# Patient Record
Sex: FEMALE | Race: WHITE | HISPANIC OR LATINO
[De-identification: names, ages, dates, MRNs, and addresses within clinical notes are randomized per-mention and may not be internally consistent; named-entity substitution may affect disease eponyms.]

---

## 2018-04-28 ENCOUNTER — CHARTING TRANS (OUTPATIENT)
Dept: OTHER | Age: 23
End: 2018-04-28

## 2018-04-28 ASSESSMENT — PAIN SCALES - GENERAL: PAINLEVEL_OUTOF10: 6

## 2018-05-01 ENCOUNTER — CHARTING TRANS (OUTPATIENT)
Dept: OTHER | Age: 23
End: 2018-05-01

## 2019-03-06 VITALS
SYSTOLIC BLOOD PRESSURE: 134 MMHG | DIASTOLIC BLOOD PRESSURE: 73 MMHG | OXYGEN SATURATION: 99 % | TEMPERATURE: 98 F | RESPIRATION RATE: 18 BRPM | HEART RATE: 100 BPM

## 2019-06-20 ENCOUNTER — HOSPITAL ENCOUNTER (OUTPATIENT)
Age: 24
Discharge: HOME | End: 2019-06-20
Attending: FAMILY MEDICINE
Payer: COMMERCIAL

## 2019-06-20 ENCOUNTER — APPOINTMENT (OUTPATIENT)
Dept: GENERAL RADIOLOGY | Age: 24
End: 2019-06-20
Attending: FAMILY MEDICINE
Payer: COMMERCIAL

## 2019-06-20 VITALS
HEART RATE: 102 BPM | OXYGEN SATURATION: 97 % | WEIGHT: 195 LBS | SYSTOLIC BLOOD PRESSURE: 134 MMHG | DIASTOLIC BLOOD PRESSURE: 90 MMHG | BODY MASS INDEX: 34.55 KG/M2 | RESPIRATION RATE: 20 BRPM | HEIGHT: 63 IN | TEMPERATURE: 97 F

## 2019-06-20 DIAGNOSIS — S93.402A MODERATE LEFT ANKLE SPRAIN, INITIAL ENCOUNTER: Primary | ICD-10-CM

## 2019-06-20 PROCEDURE — 73610 X-RAY EXAM OF ANKLE: CPT | Performed by: FAMILY MEDICINE

## 2019-06-20 PROCEDURE — 99203 OFFICE O/P NEW LOW 30 MIN: CPT

## 2019-06-20 NOTE — ED INITIAL ASSESSMENT (HPI)
Patient states she rolled her left ankle going down stairs approximately 2 weeks ago. C/O left lateral ankle pain and swelling.

## 2019-06-21 NOTE — ED PROVIDER NOTES
Patient Seen in: [id number] [facility]    History   Patient presents with: Ankle Injury    Stated Complaint: L. Ankle Injury (x2 weeks)    HPI    22-year-old female presents to the immediate care today with chief complaints of injury to her lef tenderness  EXTREMITIES:   Left  ankle exam:   - defect/deformity : no   - swelling/effusion: yes - lateral malleolus   - tenderness over medial malleolus: no   - tenderness over lateral malleolus: yes   - tenderness over ATFL: yes   - tenderness over CFL:

## 2019-07-09 ENCOUNTER — OFFICE VISIT (OUTPATIENT)
Dept: PODIATRY | Age: 24
End: 2019-07-09

## 2019-07-09 DIAGNOSIS — S93.622A SPRAIN OF TARSOMETATARSAL JOINT OF LEFT FOOT, INITIAL ENCOUNTER: Primary | ICD-10-CM

## 2019-07-09 DIAGNOSIS — M20.12 VALGUS DEFORMITY OF BOTH GREAT TOES: ICD-10-CM

## 2019-07-09 DIAGNOSIS — M20.11 VALGUS DEFORMITY OF BOTH GREAT TOES: ICD-10-CM

## 2019-07-09 PROCEDURE — 99203 OFFICE O/P NEW LOW 30 MIN: CPT | Performed by: PODIATRIST

## 2019-07-11 ENCOUNTER — APPOINTMENT (OUTPATIENT)
Dept: OTOLARYNGOLOGY | Age: 24
End: 2019-07-11

## 2019-07-12 ENCOUNTER — OFFICE VISIT (OUTPATIENT)
Dept: OTOLARYNGOLOGY | Age: 24
End: 2019-07-12

## 2019-07-12 VITALS — BODY MASS INDEX: 31.89 KG/M2 | HEIGHT: 63 IN | WEIGHT: 180 LBS

## 2019-07-12 DIAGNOSIS — J03.90 TONSILLITIS: ICD-10-CM

## 2019-07-12 DIAGNOSIS — J04.0 LARYNGITIS: Primary | ICD-10-CM

## 2019-07-12 PROCEDURE — 31575 DIAGNOSTIC LARYNGOSCOPY: CPT | Performed by: OTOLARYNGOLOGY

## 2019-07-12 PROCEDURE — 99203 OFFICE O/P NEW LOW 30 MIN: CPT | Performed by: OTOLARYNGOLOGY

## 2019-07-12 RX ORDER — METHYLPREDNISOLONE 4 MG/1
4 TABLET ORAL SEE ADMIN INSTRUCTIONS
Qty: 21 TABLET | Refills: 0 | Status: SHIPPED | OUTPATIENT
Start: 2019-07-12

## 2019-07-12 RX ORDER — AMOXICILLIN AND CLAVULANATE POTASSIUM 875; 125 MG/1; MG/1
1 TABLET, FILM COATED ORAL 2 TIMES DAILY
Qty: 20 TABLET | Refills: 0 | Status: SHIPPED | OUTPATIENT
Start: 2019-07-12 | End: 2019-07-22

## 2019-10-24 ENCOUNTER — OFFICE VISIT (OUTPATIENT)
Dept: OTOLARYNGOLOGY | Age: 24
End: 2019-10-24

## 2019-10-24 VITALS — BODY MASS INDEX: 36.68 KG/M2 | WEIGHT: 207 LBS | HEIGHT: 63 IN

## 2019-10-24 DIAGNOSIS — K21.9 LARYNGOPHARYNGEAL REFLUX (LPR): Primary | ICD-10-CM

## 2019-10-24 DIAGNOSIS — R49.0 HOARSENESS: ICD-10-CM

## 2019-10-24 PROCEDURE — 99213 OFFICE O/P EST LOW 20 MIN: CPT | Performed by: OTOLARYNGOLOGY

## 2019-10-24 PROCEDURE — 31575 DIAGNOSTIC LARYNGOSCOPY: CPT | Performed by: OTOLARYNGOLOGY

## 2019-10-24 RX ORDER — OMEPRAZOLE 40 MG/1
40 CAPSULE, DELAYED RELEASE ORAL DAILY
Qty: 30 CAPSULE | Refills: 3 | Status: SHIPPED | OUTPATIENT
Start: 2019-10-24 | End: 2019-11-23

## 2019-10-30 ENCOUNTER — APPOINTMENT (OUTPATIENT)
Dept: OTOLARYNGOLOGY | Age: 24
End: 2019-10-30

## 2019-11-12 ENCOUNTER — APPOINTMENT (OUTPATIENT)
Dept: OBGYN | Age: 24
End: 2019-11-12

## 2020-01-30 ENCOUNTER — APPOINTMENT (OUTPATIENT)
Dept: OBGYN | Age: 25
End: 2020-01-30

## 2020-02-11 ENCOUNTER — APPOINTMENT (OUTPATIENT)
Dept: ALLERGY | Age: 25
End: 2020-02-11

## 2020-05-09 ENCOUNTER — TELEPHONE (OUTPATIENT)
Dept: SCHEDULING | Age: 25
End: 2020-05-09

## 2022-07-08 ENCOUNTER — HOSPITAL ENCOUNTER (EMERGENCY)
Age: 27
Discharge: HOME | End: 2022-07-08
Attending: EMERGENCY MEDICINE
Payer: COMMERCIAL

## 2022-07-08 VITALS
DIASTOLIC BLOOD PRESSURE: 91 MMHG | RESPIRATION RATE: 16 BRPM | OXYGEN SATURATION: 100 % | WEIGHT: 207 LBS | TEMPERATURE: 98 F | HEIGHT: 62 IN | HEART RATE: 88 BPM | BODY MASS INDEX: 38.09 KG/M2 | SYSTOLIC BLOOD PRESSURE: 120 MMHG

## 2022-07-08 DIAGNOSIS — R30.0 DYSURIA: Primary | ICD-10-CM

## 2022-07-08 LAB
B-HCG UR QL: NEGATIVE
BILIRUB UR QL STRIP.AUTO: NEGATIVE
CLARITY UR REFRACT.AUTO: CLEAR
COLOR UR AUTO: YELLOW
GLUCOSE UR STRIP.AUTO-MCNC: NEGATIVE MG/DL
KETONES UR STRIP.AUTO-MCNC: NEGATIVE MG/DL
NITRITE UR QL STRIP.AUTO: NEGATIVE
PH UR STRIP.AUTO: 6.5 [PH] (ref 5–8)
PROT UR STRIP.AUTO-MCNC: NEGATIVE MG/DL
RBC UR QL AUTO: NEGATIVE
SP GR UR STRIP.AUTO: 1.02 (ref 1–1.03)
UROBILINOGEN UR STRIP.AUTO-MCNC: 0.2 MG/DL

## 2022-07-08 PROCEDURE — 87086 URINE CULTURE/COLONY COUNT: CPT | Performed by: EMERGENCY MEDICINE

## 2022-07-08 PROCEDURE — 81025 URINE PREGNANCY TEST: CPT

## 2022-07-08 PROCEDURE — 87491 CHLMYD TRACH DNA AMP PROBE: CPT | Performed by: EMERGENCY MEDICINE

## 2022-07-08 PROCEDURE — 87591 N.GONORRHOEAE DNA AMP PROB: CPT | Performed by: EMERGENCY MEDICINE

## 2022-07-08 PROCEDURE — 81001 URINALYSIS AUTO W/SCOPE: CPT | Performed by: EMERGENCY MEDICINE

## 2022-07-08 PROCEDURE — 99283 EMERGENCY DEPT VISIT LOW MDM: CPT | Performed by: EMERGENCY MEDICINE

## 2022-07-08 RX ORDER — SULFAMETHOXAZOLE AND TRIMETHOPRIM 800; 160 MG/1; MG/1
1 TABLET ORAL 2 TIMES DAILY
Qty: 14 TABLET | Refills: 0 | Status: SHIPPED | OUTPATIENT
Start: 2022-07-08 | End: 2022-07-15

## 2022-07-08 RX ORDER — SULFAMETHOXAZOLE AND TRIMETHOPRIM 800; 160 MG/1; MG/1
1 TABLET ORAL ONCE
Status: COMPLETED | OUTPATIENT
Start: 2022-07-08 | End: 2022-07-08

## 2022-07-09 NOTE — ED INITIAL ASSESSMENT (HPI)
For the last 5 days painful urination. Today diarrhea and fever. Patient also mentioned miscarriage at the end of may.

## 2022-07-11 LAB
C TRACH DNA SPEC QL NAA+PROBE: NEGATIVE
N GONORRHOEA DNA SPEC QL NAA+PROBE: NEGATIVE

## 2023-03-06 ENCOUNTER — HOSPITAL ENCOUNTER (EMERGENCY)
Age: 28
Discharge: HOME | End: 2023-03-07
Attending: EMERGENCY MEDICINE
Payer: COMMERCIAL

## 2023-03-06 ENCOUNTER — APPOINTMENT (OUTPATIENT)
Dept: CT IMAGING | Age: 28
End: 2023-03-06
Attending: NURSE PRACTITIONER
Payer: COMMERCIAL

## 2023-03-06 DIAGNOSIS — K52.9 ENTEROCOLITIS: Primary | ICD-10-CM

## 2023-03-06 LAB
ALBUMIN SERPL-MCNC: 4 G/DL (ref 3.4–5)
ALBUMIN/GLOB SERPL: 0.9 {RATIO} (ref 1–2)
ALP LIVER SERPL-CCNC: 128 U/L
ALT SERPL-CCNC: 27 U/L
ANION GAP SERPL CALC-SCNC: 9 MMOL/L (ref 0–18)
AST SERPL-CCNC: 16 U/L (ref 15–37)
B-HCG UR QL: NEGATIVE
BASOPHILS # BLD AUTO: 0.02 X10(3) UL (ref 0–0.2)
BASOPHILS NFR BLD AUTO: 0.1 %
BILIRUB SERPL-MCNC: 0.4 MG/DL (ref 0.1–2)
BILIRUB UR QL CFM: NEGATIVE
BUN BLD-MCNC: 14 MG/DL (ref 7–18)
CALCIUM BLD-MCNC: 9.3 MG/DL (ref 8.5–10.1)
CHLORIDE SERPL-SCNC: 108 MMOL/L (ref 98–112)
CO2 SERPL-SCNC: 22 MMOL/L (ref 21–32)
COLOR UR AUTO: YELLOW
CREAT BLD-MCNC: 0.74 MG/DL
EOSINOPHIL # BLD AUTO: 0.01 X10(3) UL (ref 0–0.7)
EOSINOPHIL NFR BLD AUTO: 0.1 %
ERYTHROCYTE [DISTWIDTH] IN BLOOD BY AUTOMATED COUNT: 14.8 %
GFR SERPLBLD BASED ON 1.73 SQ M-ARVRAT: 112 ML/MIN/1.73M2 (ref 60–?)
GLOBULIN PLAS-MCNC: 4.7 G/DL (ref 2.8–4.4)
GLUCOSE BLD-MCNC: 131 MG/DL (ref 70–99)
GLUCOSE UR STRIP.AUTO-MCNC: NEGATIVE MG/DL
HCT VFR BLD AUTO: 43.1 %
HGB BLD-MCNC: 13.4 G/DL
HYALINE CASTS #/AREA URNS AUTO: PRESENT /LPF
IMM GRANULOCYTES # BLD AUTO: 0.08 X10(3) UL (ref 0–1)
IMM GRANULOCYTES NFR BLD: 0.4 %
KETONES UR STRIP.AUTO-MCNC: NEGATIVE MG/DL
LEUKOCYTE ESTERASE UR QL STRIP.AUTO: NEGATIVE
LYMPHOCYTES # BLD AUTO: 0.82 X10(3) UL (ref 1–4)
LYMPHOCYTES NFR BLD AUTO: 4.2 %
MCH RBC QN AUTO: 24.1 PG (ref 26–34)
MCHC RBC AUTO-ENTMCNC: 31.1 G/DL (ref 31–37)
MCV RBC AUTO: 77.4 FL
MONOCYTES # BLD AUTO: 0.78 X10(3) UL (ref 0.1–1)
MONOCYTES NFR BLD AUTO: 4 %
NEUTROPHILS # BLD AUTO: 17.96 X10 (3) UL (ref 1.5–7.7)
NEUTROPHILS # BLD AUTO: 17.96 X10(3) UL (ref 1.5–7.7)
NEUTROPHILS NFR BLD AUTO: 91.2 %
NITRITE UR QL STRIP.AUTO: NEGATIVE
OSMOLALITY SERPL CALC.SUM OF ELEC: 290 MOSM/KG (ref 275–295)
PH UR STRIP.AUTO: 5.5 [PH] (ref 5–8)
PLATELET # BLD AUTO: 296 10(3)UL (ref 150–450)
POCT INFLUENZA A: NEGATIVE
POCT INFLUENZA B: NEGATIVE
POTASSIUM SERPL-SCNC: 4.1 MMOL/L (ref 3.5–5.1)
PROT SERPL-MCNC: 8.7 G/DL (ref 6.4–8.2)
RBC # BLD AUTO: 5.57 X10(6)UL
RBC UR QL AUTO: NEGATIVE
SARS-COV-2 RNA RESP QL NAA+PROBE: NOT DETECTED
SODIUM SERPL-SCNC: 139 MMOL/L (ref 136–145)
SP GR UR STRIP.AUTO: 1.02 (ref 1–1.03)
UROBILINOGEN UR STRIP.AUTO-MCNC: 0.2 MG/DL
WBC # BLD AUTO: 19.7 X10(3) UL (ref 4–11)

## 2023-03-06 PROCEDURE — 96375 TX/PRO/DX INJ NEW DRUG ADDON: CPT

## 2023-03-06 PROCEDURE — 96374 THER/PROPH/DIAG INJ IV PUSH: CPT

## 2023-03-06 PROCEDURE — 99285 EMERGENCY DEPT VISIT HI MDM: CPT

## 2023-03-06 PROCEDURE — 87502 INFLUENZA DNA AMP PROBE: CPT | Performed by: EMERGENCY MEDICINE

## 2023-03-06 PROCEDURE — 87502 INFLUENZA DNA AMP PROBE: CPT

## 2023-03-06 PROCEDURE — 81015 MICROSCOPIC EXAM OF URINE: CPT | Performed by: EMERGENCY MEDICINE

## 2023-03-06 PROCEDURE — 74177 CT ABD & PELVIS W/CONTRAST: CPT | Performed by: NURSE PRACTITIONER

## 2023-03-06 PROCEDURE — 81025 URINE PREGNANCY TEST: CPT

## 2023-03-06 PROCEDURE — 81001 URINALYSIS AUTO W/SCOPE: CPT | Performed by: EMERGENCY MEDICINE

## 2023-03-06 PROCEDURE — 96361 HYDRATE IV INFUSION ADD-ON: CPT

## 2023-03-06 PROCEDURE — 85025 COMPLETE CBC W/AUTO DIFF WBC: CPT | Performed by: NURSE PRACTITIONER

## 2023-03-06 PROCEDURE — 80053 COMPREHEN METABOLIC PANEL: CPT | Performed by: NURSE PRACTITIONER

## 2023-03-06 RX ORDER — ONDANSETRON 4 MG/1
4 TABLET, ORALLY DISINTEGRATING ORAL ONCE
Status: COMPLETED | OUTPATIENT
Start: 2023-03-06 | End: 2023-03-06

## 2023-03-06 RX ORDER — KETOROLAC TROMETHAMINE 30 MG/ML
30 INJECTION, SOLUTION INTRAMUSCULAR; INTRAVENOUS ONCE
Status: COMPLETED | OUTPATIENT
Start: 2023-03-06 | End: 2023-03-06

## 2023-03-06 RX ORDER — ONDANSETRON 2 MG/ML
4 INJECTION INTRAMUSCULAR; INTRAVENOUS ONCE
Status: COMPLETED | OUTPATIENT
Start: 2023-03-06 | End: 2023-03-06

## 2023-03-06 RX ORDER — ONDANSETRON 4 MG/1
4 TABLET, ORALLY DISINTEGRATING ORAL EVERY 4 HOURS PRN
Qty: 30 TABLET | Refills: 0 | Status: SHIPPED | OUTPATIENT
Start: 2023-03-06 | End: 2023-03-13

## 2023-03-07 VITALS
SYSTOLIC BLOOD PRESSURE: 102 MMHG | OXYGEN SATURATION: 97 % | WEIGHT: 220 LBS | DIASTOLIC BLOOD PRESSURE: 59 MMHG | HEART RATE: 111 BPM | BODY MASS INDEX: 38.98 KG/M2 | RESPIRATION RATE: 20 BRPM | TEMPERATURE: 97 F | HEIGHT: 63 IN

## 2023-03-07 PROBLEM — K52.9 ENTEROCOLITIS: Status: ACTIVE | Noted: 2023-03-07

## 2023-03-07 PROCEDURE — 96361 HYDRATE IV INFUSION ADD-ON: CPT

## 2023-03-07 PROCEDURE — 96375 TX/PRO/DX INJ NEW DRUG ADDON: CPT

## 2023-03-07 RX ORDER — METOCLOPRAMIDE HYDROCHLORIDE 5 MG/ML
10 INJECTION INTRAMUSCULAR; INTRAVENOUS ONCE
Status: COMPLETED | OUTPATIENT
Start: 2023-03-07 | End: 2023-03-07

## 2023-03-07 RX ORDER — SODIUM CHLORIDE 9 MG/ML
INJECTION, SOLUTION INTRAVENOUS ONCE
Status: COMPLETED | OUTPATIENT
Start: 2023-03-07 | End: 2023-03-07

## 2023-03-07 RX ORDER — DIPHENHYDRAMINE HYDROCHLORIDE 50 MG/ML
25 INJECTION INTRAMUSCULAR; INTRAVENOUS ONCE
Status: COMPLETED | OUTPATIENT
Start: 2023-03-07 | End: 2023-03-07

## 2023-03-07 RX ORDER — LORAZEPAM 2 MG/ML
0.5 INJECTION INTRAMUSCULAR ONCE
Status: COMPLETED | OUTPATIENT
Start: 2023-03-07 | End: 2023-03-07

## 2023-03-07 NOTE — ED PROVIDER NOTES
Patient has been boarding since last evening for intractable nausea and vomiting thought to be secondary to gastroenteritis. I took no part in this patient's care. Patient was admitted to the hospital and awaiting a bed assignment at the [facility].  Patient's been tolerating p.o. here this morning and had notified us they would like to be discharged home. I will page the Jan[facility] hospitalist and make sure that is okay with them. They obviously have not been evaluated by the hospitalist because the patient is still here in [place] waiting for a bed at the [facility] but is feeling much better wants be discharged home.   Assuming hospitalist is okay with that I will place a discharge order

## 2023-03-07 NOTE — ED PROVIDER NOTES
Care assumed at shift change. Patient was receiving IV fluids and more antiemetics at that time. She continued to have of additional episodes of emesis after receiving multiple antiemetics. Ativan was then given. I discussed hospitalization and observation with patient due to continued intractable nausea and vomiting.   Case was discussed with hospitalist.

## 2023-03-07 NOTE — ED NOTES
I reviewed that chart and discussed the case. I have examined the patient and noted        This is a 75-year-old female who presents with complaints of nausea vomiting and abdominal pain. Been nauseous all week has been having more diarrhea diarrhea began today. 3 episodes of emesis and multiple episodes diarrhea no blood in no recent antibiotic no recent travel no recent sick contacts. The abdominal pain is described as moderate in nature. More on the right side she had a gallbladder resection. General: Female in some moderate discomfort secondary to pain. Oromucosa is wet lips look dry  The patient is in no respiratory distress    HEENT: There is no signs of trauma. Oral mucosa is wet. Lungs: Clear to auscultation without wheezing or retractions    Cardiovascular: Regular without murmurs  Abdomen is soft with some tenderness in the right lower, right mid abdomen moderately tender left lower quadrants nontender  Extremities: Good pulses bilaterally. Neuro: Alert and oriented. The patient is moving all extremities there is no focal findings. The patient was placed on monitors, IV was started, blood was drawn. Get a work-up for to rule out appendicitis, perforation, obstruction. I personally reviewed the radiographs and my individual interpretation shows    No perforation or obstruction fluid in the small bowel. Also reviewed official report and it shows    CT ABDOMEN PELVIS IV CONTRAST, NO ORAL (ER)    Result Date: 3/6/2023  PROCEDURE:  CT ABDOMEN PELVIS IV CONTRAST, NO ORAL (ER)  LOCATION:                                       COMPARISON:  None. INDICATIONS:  N/V/D  TECHNIQUE:  CT scanning was performed from the dome of the diaphragm to the pubic symphysis with non-ionic intravenous contrast material. Post contrast coronal MPR imaging was performed. Dose reduction techniques were used.  Dose information is transmitted to the [facility]) Eric Mathews [address] [place] [address] Radiology Data Registry) which includes the Dose Index Registry. PATIENT STATED HISTORY:(As transcribed by Technologist)  Pt complains of nausea, vomiting,bodyaches, and diarrhea with general abdomen pain. CONTRAST USED:  100cc of Isovue 370. The patient experienced worsening of the nausea and vomiting after administration of contrast.  FINDINGS: LUNG BASE:  Mild atelectasis/scarring LIVER:  Diffuse fatty infiltration of the liver. BILIARY:  Status post cholecystectomy. SPLEEN:  Unremarkable. PANCREAS:  Unremarkable. ADRENALS:  Unremarkable. KIDNEYS:  Unremarkable. AORTA/VASCULAR:  Unremarkable. RETROPERITONEUM:  Unremarkable. BOWEL/MESENTERY:  Small hiatal hernia. Unremarkable appendix. Fluid in the large and small bowel is concerning for nonspecific enterocolitis. Clinical correlation recommended. No large or small bowel dilatation. No free air or free fluid. ABDOMINAL WALL:  Minimal fat containing umbilical hernia. PELVIC ORGANS:  Decompressed urinary bladder. Unremarkable uterus and ovaries. LYMPH NODES:  No lymphadenopathy in the abdomen or pelvis. BONES:  Unremarkable. OTHER:  None. CONCLUSION:   1. Findings concerning for nonspecific enterocolitis. Clinical correlation recommended. 2. Diffuse fatty infiltration of the liver. Please see above for further details. Dictated by (CST): Zuhair Rapp MD on 3/06/2023 at 11:26 PM     Finalized by (CST): Zuhair Rapp MD on 3/06/2023 at 11:27 PM       The patient was reexamined her abdomen is soft without rebound, guarding. Appendix is normal the white count is probably secondary to dehydration, stress reaction.   Urine was negative for urine tract infection pregnancy is negative comprehensive is grossly normal.  She was did get Toradol I did go back and reexamined she still nauseous we will give her Toradol, Zofran repeat abdominal exam is completely soft without rebound or guarding some mild tenderness in the lower abdomen but no rebound or guarding. I did go back and reexamined her approximate 12:04 AM.  Repeat abdominal exam is completely soft nontender no rebound no guarding but I discussed importance of close follow-up with her primary care physician she is less nauseous. This the white count is probably a stress reaction but I discussed that should be follow-up with her own primary care physician her  was in the room and I talked her extensively. I discussed with the patient that were seeing them  in a short period of time. I discussed with them that there is always a possibility that things can change and a need reevaluation with their primary care physician as soon as possible. I've also discussed with them that if the pain gets worse to return to the emergency room immediately. Assessment  Abdominal pain  Vomiting and diarrhea. Leukocytosis      I provided a substantive portion of care for this patient. I personally performed the medical decision making for this encounter.

## 2023-03-07 NOTE — ED INITIAL ASSESSMENT (HPI)
PT to the ED for evaluation of n/v/d and abdomen pain. The nausea has been there for a week and the vomiting and diarrhea began at 1600 today. PT reports 3 episodes of emesis and multiple episodes of diarrhea. No recent antibiotic use. No sick contacts.

## 2023-03-28 ENCOUNTER — OFFICE VISIT (OUTPATIENT)
Dept: OBGYN CLINIC | Facility: CLINIC | Age: 28
End: 2023-03-28

## 2023-03-28 VITALS
WEIGHT: 226.75 LBS | SYSTOLIC BLOOD PRESSURE: 106 MMHG | HEART RATE: 112 BPM | BODY MASS INDEX: 40.18 KG/M2 | DIASTOLIC BLOOD PRESSURE: 74 MMHG | HEIGHT: 63 IN

## 2023-03-28 DIAGNOSIS — Z01.419 ENCOUNTER FOR WELL WOMAN EXAM WITH ROUTINE GYNECOLOGICAL EXAM: Primary | ICD-10-CM

## 2023-03-28 DIAGNOSIS — N92.6 IRREGULAR MENSES: ICD-10-CM

## 2023-03-28 DIAGNOSIS — N76.0 VAGINITIS AND VULVOVAGINITIS: ICD-10-CM

## 2023-03-28 PROCEDURE — 3078F DIAST BP <80 MM HG: CPT | Performed by: OBSTETRICS & GYNECOLOGY

## 2023-03-28 PROCEDURE — 3008F BODY MASS INDEX DOCD: CPT | Performed by: OBSTETRICS & GYNECOLOGY

## 2023-03-28 PROCEDURE — 99395 PREV VISIT EST AGE 18-39: CPT | Performed by: OBSTETRICS & GYNECOLOGY

## 2023-03-28 PROCEDURE — 3074F SYST BP LT 130 MM HG: CPT | Performed by: OBSTETRICS & GYNECOLOGY

## 2023-03-28 RX ORDER — MEDROXYPROGESTERONE ACETATE 10 MG/1
10 TABLET ORAL DAILY
Qty: 10 TABLET | Refills: 2 | Status: SHIPPED | OUTPATIENT
Start: 2023-03-28

## 2023-03-30 ENCOUNTER — TELEPHONE (OUTPATIENT)
Dept: OBGYN CLINIC | Facility: CLINIC | Age: 28
End: 2023-03-30

## 2023-03-30 RX ORDER — METRONIDAZOLE 7.5 MG/G
1 GEL VAGINAL NIGHTLY
Qty: 70 G | Refills: 0 | Status: SHIPPED | OUTPATIENT
Start: 2023-03-30 | End: 2023-04-04

## 2023-04-17 LAB — HPV I/H RISK 1 DNA SPEC QL NAA+PROBE: NEGATIVE

## 2023-08-17 ENCOUNTER — TELEPHONE (OUTPATIENT)
Dept: OBGYN CLINIC | Facility: CLINIC | Age: 28
End: 2023-08-17

## 2023-08-17 ENCOUNTER — LAB ENCOUNTER (OUTPATIENT)
Dept: LAB | Age: 28
End: 2023-08-17
Attending: OBSTETRICS & GYNECOLOGY
Payer: COMMERCIAL

## 2023-08-17 DIAGNOSIS — N92.6 IRREGULAR MENSES: ICD-10-CM

## 2023-08-17 LAB
CHOLEST SERPL-MCNC: 175 MG/DL (ref ?–200)
DHEA-S SERPL-MCNC: 69.2 UG/DL
EST. AVERAGE GLUCOSE BLD GHB EST-MCNC: 114 MG/DL (ref 68–126)
ESTRADIOL SERPL-MCNC: 104.6 PG/ML
FASTING PATIENT GLUCOSE ANSWER: YES
FASTING PATIENT LIPID ANSWER: YES
FSH SERPL-ACNC: 1.8 MIU/ML
GLUCOSE BLD-MCNC: 93 MG/DL (ref 70–99)
HBA1C MFR BLD: 5.6 % (ref ?–5.7)
HDLC SERPL-MCNC: 43 MG/DL (ref 40–59)
INSULIN SERPL-ACNC: 27.8 MU/L (ref 3–25)
LDLC SERPL CALC-MCNC: 114 MG/DL (ref ?–100)
LH SERPL-ACNC: 5.1 MIU/ML
NONHDLC SERPL-MCNC: 132 MG/DL (ref ?–130)
PROLACTIN SERPL-MCNC: 15.9 NG/ML
TRIGL SERPL-MCNC: 100 MG/DL (ref 30–149)
TSI SER-ACNC: 1.1 MIU/ML (ref 0.36–3.74)
VLDLC SERPL CALC-MCNC: 17 MG/DL (ref 0–30)

## 2023-08-17 PROCEDURE — 83001 ASSAY OF GONADOTROPIN (FSH): CPT

## 2023-08-17 PROCEDURE — 84146 ASSAY OF PROLACTIN: CPT

## 2023-08-17 PROCEDURE — 82627 DEHYDROEPIANDROSTERONE: CPT

## 2023-08-17 PROCEDURE — 83525 ASSAY OF INSULIN: CPT

## 2023-08-17 PROCEDURE — 80061 LIPID PANEL: CPT

## 2023-08-17 PROCEDURE — 83002 ASSAY OF GONADOTROPIN (LH): CPT

## 2023-08-17 PROCEDURE — 82947 ASSAY GLUCOSE BLOOD QUANT: CPT

## 2023-08-17 PROCEDURE — 84410 TESTOSTERONE BIOAVAILABLE: CPT

## 2023-08-17 PROCEDURE — 83036 HEMOGLOBIN GLYCOSYLATED A1C: CPT

## 2023-08-17 PROCEDURE — 84443 ASSAY THYROID STIM HORMONE: CPT

## 2023-08-17 PROCEDURE — 36415 COLL VENOUS BLD VENIPUNCTURE: CPT

## 2023-08-17 PROCEDURE — 82670 ASSAY OF TOTAL ESTRADIOL: CPT

## 2023-08-18 ENCOUNTER — TELEPHONE (OUTPATIENT)
Dept: OBGYN CLINIC | Facility: CLINIC | Age: 28
End: 2023-08-18

## 2023-08-18 NOTE — TELEPHONE ENCOUNTER
Called and spoke to patient regarding results. Schedule patient appointment. Patient verbalized understanding no further concerns at this time.

## 2023-08-18 NOTE — TELEPHONE ENCOUNTER
Pt has been having migraines, with no visual disturbances, this entire week. Pt taking Tylenol with little relief. Pt does have a hx of migraines in the past, and has been evaluated by a neurologist. Pt wondering if her migraines could be hormonal. Advised pt that hormones, along with many other reasons, can cause migraines. Pt voices she will talk to Dr. Logan Gray at her 09/07/23 regarding her migraines. Advised pt she can always go to the [place] [address] ER for evaluation. Pt voices understanding.

## 2023-08-18 NOTE — TELEPHONE ENCOUNTER
----- Message from Jimmie Kussmaul, MD sent at 8/17/2023  2:10 PM CDT -----  Insulin equals 27.8 should be less than 9, lipid panel elevated, hemoglobin A1c 5.6, estradiol is 104.   Patient needs follow-up office visit and ultrasound with me to evaluate  Jimmie Kussmaul, MD

## 2023-08-21 RX ORDER — BUTALBITAL, ACETAMINOPHEN AND CAFFEINE 300; 40; 50 MG/1; MG/1; MG/1
1 CAPSULE ORAL EVERY 6 HOURS PRN
Qty: 30 CAPSULE | Refills: 1 | Status: SHIPPED | OUTPATIENT
Start: 2023-08-21 | End: 2023-09-04

## 2023-08-21 NOTE — TELEPHONE ENCOUNTER
Pt voices she has a hx of headaches, but her headaches worsen in intensity after starting her progesterone. Pt would like Fioricet ordered for her.

## 2023-08-21 NOTE — TELEPHONE ENCOUNTER
Pt currently at work. Voices she will call the office back. Please put through to [facility].

## 2023-08-21 NOTE — TELEPHONE ENCOUNTER
Can offer Fioricet - did she start having them since the provera or is she on her menses?       Matthew Peraza MD

## 2023-08-22 NOTE — TELEPHONE ENCOUNTER
Sent to her pharmacy - call her and let her know to pick it up - keep us posted if no improvement.  Mikle Kehr, MD

## 2023-08-23 LAB
SEX HORM BIND GLOB: 27.9 NMOL/L
TESTOST % FREE+WEAK BND: 20.5 %
TESTOST FREE+WEAK BND: 4 NG/DL
TESTOSTERONE TOT /MS: 19.5 NG/DL

## 2023-09-07 ENCOUNTER — OFFICE VISIT (OUTPATIENT)
Dept: OBGYN CLINIC | Facility: CLINIC | Age: 28
End: 2023-09-07

## 2023-09-07 VITALS
SYSTOLIC BLOOD PRESSURE: 118 MMHG | BODY MASS INDEX: 40.46 KG/M2 | WEIGHT: 228.38 LBS | DIASTOLIC BLOOD PRESSURE: 80 MMHG | HEIGHT: 63 IN

## 2023-09-07 DIAGNOSIS — E28.0 HYPERESTROGENISM: ICD-10-CM

## 2023-09-07 DIAGNOSIS — E88.81 RESISTANCE TO INSULIN: Primary | ICD-10-CM

## 2023-09-07 PROBLEM — E88.819 RESISTANCE TO INSULIN: Status: ACTIVE | Noted: 2023-09-07

## 2023-09-07 PROCEDURE — 3008F BODY MASS INDEX DOCD: CPT | Performed by: OBSTETRICS & GYNECOLOGY

## 2023-09-07 PROCEDURE — 99214 OFFICE O/P EST MOD 30 MIN: CPT | Performed by: OBSTETRICS & GYNECOLOGY

## 2023-09-07 PROCEDURE — 3079F DIAST BP 80-89 MM HG: CPT | Performed by: OBSTETRICS & GYNECOLOGY

## 2023-09-07 PROCEDURE — 3074F SYST BP LT 130 MM HG: CPT | Performed by: OBSTETRICS & GYNECOLOGY

## 2023-09-07 RX ORDER — NORGESTIMATE AND ETHINYL ESTRADIOL 0.25-0.035
1 KIT ORAL DAILY
Qty: 84 TABLET | Refills: 4 | Status: SHIPPED | OUTPATIENT
Start: 2023-09-07

## 2023-09-07 RX ORDER — METFORMIN HYDROCHLORIDE 500 MG/1
TABLET, FILM COATED, EXTENDED RELEASE ORAL
Qty: 291 TABLET | Refills: 0 | Status: CANCELLED | OUTPATIENT
Start: 2023-09-07 | End: 2023-09-28

## 2023-09-07 NOTE — PROGRESS NOTES
Patient presents with:  Ultrasound        Maria Antonia Beasley is a 25year old female who presents for ultrasound. Insulin 27.8   lipid panel elevated  hemoglobin A1c 5.6  estradiol is 104. Immunization History   Administered Date(s) Administered    DTP 1993, 1993, 1994, 1994    FLUZONE 6 months and older PFS 0.5 ml (87098) 2015, 10/26/2016    Fluvirin, 3 Years & >, Im 2013, 11/10/2015    HEP B 1993, 1994    HPV (Gardasil) 10/01/2014, 10/20/2014, 2014, 2014, 2015, 2015    Hib, Unspecified Formulation 1993, 1993, 1994, 1994    MMR 1994    OPV 1993, 1993, 1994    TDAP 2016       No current outpatient medications on file. No Known Allergies    OB History     T2    L2    SAB1  IAB0  Ectopic0  Multiple0  Live Births2     Name of Baby 1: Not recorded    Date: Not recorded     GA: Not recorded     Delivery: Not recorded    Nicol Mayans: Not recorded     Odmario Bodo: Not recorded    Living: Not recorded    Name of Baby 2: Not recorded    Date: Not recorded     GA: Not recorded     Delivery: Not recorded    Nicol Mayans: Not recorded     Odmario Bodo: Not recorded    Living: Not recorded    Name of Baby 3: Kylah Sepulveda    Date:              GA: Not recorded     Delivery: Vaginal, Spontaneous    Apgar1: Not recorded     Apgar5: Not recorded    Living: Living    Name of Baby 4: [place]    Date: 16         GA: 39w0d            Delivery: Vaginal, Spontaneous    Apgar1: Not recorded     Apgar5: Not recorded    Living: Living      No past medical history on file. Past Surgical History:   Procedure Laterality Date    REMOVAL GALLBLADDER         No family history on file.      Allergies  Meds         Social History    Socioeconomic History      Marital status:       Spouse name: Not on file      Number of children: Not on file      Years of education: Not on file      Highest education level: Not on file    Occupational History      Not on file    Tobacco Use      Smoking status: Never      Smokeless tobacco: Never    Vaping Use      Vaping Use: Never used    Substance and Sexual Activity      Alcohol use: Never      Drug use: Never      Sexual activity: Not on file    Other Topics      Concerns:        Caffeine Concern: Not Asked        Exercise: Not Asked        Seat Belt: Not Asked        Special Diet: Not Asked        Stress Concern: Not Asked        Weight Concern: Not Asked    Social History Narrative      Not on file    Social Determinants of Health  Financial Resource Strain: Not on file  Food Insecurity: Not on file  Transportation Needs: Not on file  Physical Activity: Not on file  Stress: Not on file  Social Connections: Not on file  Housing Stability: Not on file      /80   Ht 5' 3\" (1.6 m)   Wt 228 lb 6.4 oz (103.6 kg)   LMP 04/15/2023 (Within Days)     Wt Readings from Last 3 Encounters:  09/07/23 : 228 lb 6.4 oz (103.6 kg)  03/28/23 : 226 lb 11.9 oz (102.9 kg)  03/06/23 : 220 lb (99.8 kg)      COVID-19 Vaccine(1) Never done  Influenza Vaccine(1) due on 10/01/2023  Annual Physical due on 03/28/2024  DTaP,Tdap,and Td Vaccines(6 - Td or Tdap) due on 01/20/2026  Pap Smear due on 03/28/2026  Annual Depression Screening Completed  Pneumococcal Vaccine: Birth to 64yrs Aged Out      Review of Systems   General: Present- Feeling well. Not Present- Fever. Female Genitourinary: Not Present- Dysmenorrhea, Dyspareunia, Flank Pain, Frequency, Menstrual Irregularities, Pelvic Pain, Urgency, Urinary Complaints, Vaginal Bleeding and Vaginal dryness. Pain: Present- Pain Rating - 0 on a 0-10 scale. All other systems negative       Physical Exam   The physical exam findings are as follows: Abdomen   Inspection: - Inspection Normal.  Palpation/Percussion: Palpation and Percussion of the abdomen reveal - Non Tender, No hepatosplenomegaly and No Palpable abdominal masses.     Female Genitourinary External Genitalia   Perineum - Normal. Bartholin's Gland - Bilateral - Normal. Clitoris - Normal.  Introitus: Characteristics - Normal. Discharge - None. Labia Majora: Lesions - Bilateral - None. Characteristics - Bilateral - Normal.  Labia Minora: Lesions - Bilateral - None. Characteristics - Bilateral - Normal.  Urethra: Characteristics - Normal. Discharge - None. Hettinger Gland - Bilateral - Normal.  Vulva: Characteristics - Normal. Lesions - None. Speculum & Bimanual   Vagina:   Vaginal Wall: - Normal.  Vaginal Lesions - None. Vaginal Mucosa - Normal.  Cervix: Characteristics - No Motion tenderness. Discharge - None. Uterus: Characteristics - Non Tender. Position - Midposition. Adnexa: Characteristics - Bilateral - Tender. Masses - No Adnexal Masses. Bladder - Normal.    Rectal   Anorectal Exam: External - normal external exam.    Lymphatic  General Lymphatics   Description - Normal .    Location: Transabdominal   Transvaginal x    Gyn Data: Uterine Size wnls       Uterine Lining thin      Uterus wnls Y/N y      Adnexa wnls Y/N y few follicles       Adnexa       Right       Left         Impression: few larger follicles - not PCOS appearing   Findings consistent with insulin resistance we will start her on metformin and 4 times a day Accu-Cheks with diet and exercise changes low-carb diet. We will taper her metformin to 1500 mg a day with a probiotic patient warned against diarrhea if consumption with high carb content. IF and low carb diet discussed. TSH elevated will start her on Synthroid to see if we can manage that level less than 1 on her TSH. PCOS ovaries with an ovulation and elevated estradiol plan for birth control pills x3 months to see if can manage her ovaries and then provide ovulation induction with Femara the first cycle off. Prenatal vitamin encouraged with vitamin D.      RTO in 3 months to discuss next steps.        Plan to see the weight loss clinic start a birth control pill for 3 months to lower her estrogen overall discussed diet and exercise for her elevated insulin and to delay attempting conception until she is a little bit healthier. 1. Resistance to insulin    2. Hyperestrogenism    3.  BMI 40.0-44.9, adult ([facility] [address]

## 2023-09-08 ENCOUNTER — TELEPHONE (OUTPATIENT)
Dept: OBGYN CLINIC | Facility: CLINIC | Age: 28
End: 2023-09-08

## 2023-09-08 NOTE — TELEPHONE ENCOUNTER
Pt Name and  verified. Pt states that she did not receive the metformin prescription that was talked about with provider at her apt. Per written documentation in pt's chart, order for metformin sent in to taper her to 1500 mg.

## 2023-10-19 ENCOUNTER — OFFICE VISIT (OUTPATIENT)
Dept: OBGYN CLINIC | Facility: CLINIC | Age: 28
End: 2023-10-19
Payer: COMMERCIAL

## 2023-10-19 VITALS
DIASTOLIC BLOOD PRESSURE: 80 MMHG | SYSTOLIC BLOOD PRESSURE: 120 MMHG | HEIGHT: 63 IN | WEIGHT: 228.38 LBS | BODY MASS INDEX: 40.46 KG/M2

## 2023-10-19 DIAGNOSIS — N63.10 MASS OF RIGHT BREAST, UNSPECIFIED QUADRANT: Primary | ICD-10-CM

## 2023-10-19 PROCEDURE — 3008F BODY MASS INDEX DOCD: CPT | Performed by: OBSTETRICS & GYNECOLOGY

## 2023-10-19 PROCEDURE — 3074F SYST BP LT 130 MM HG: CPT | Performed by: OBSTETRICS & GYNECOLOGY

## 2023-10-19 PROCEDURE — 99213 OFFICE O/P EST LOW 20 MIN: CPT | Performed by: OBSTETRICS & GYNECOLOGY

## 2023-10-19 PROCEDURE — 3079F DIAST BP 80-89 MM HG: CPT | Performed by: OBSTETRICS & GYNECOLOGY

## 2023-10-20 ENCOUNTER — TELEPHONE (OUTPATIENT)
Dept: OBGYN CLINIC | Facility: CLINIC | Age: 28
End: 2023-10-20

## 2023-10-20 NOTE — TELEPHONE ENCOUNTER
Pt was seen in office yesterday and needs a note to excuse her from work today.  Please advise can be uploaded to [employer]

## 2023-10-20 NOTE — TELEPHONE ENCOUNTER
Per lashawn Hutchins to send letter. Patient verified name and     Patient has hemorrhoid that was evaluated yesterday and having issues with sitting today. Patient would like a letter for work today. Letter generated and sent through [employer]. Patient verbalized understanding and agreed.

## 2023-10-28 ENCOUNTER — APPOINTMENT (OUTPATIENT)
Dept: PEDIATRICS | Age: 28
End: 2023-10-28

## 2023-11-09 ENCOUNTER — OFFICE VISIT (OUTPATIENT)
Dept: OBGYN CLINIC | Facility: CLINIC | Age: 28
End: 2023-11-09

## 2023-11-09 ENCOUNTER — TELEPHONE (OUTPATIENT)
Dept: OBGYN CLINIC | Facility: CLINIC | Age: 28
End: 2023-11-09

## 2023-11-09 VITALS
DIASTOLIC BLOOD PRESSURE: 78 MMHG | SYSTOLIC BLOOD PRESSURE: 121 MMHG | WEIGHT: 222 LBS | BODY MASS INDEX: 39.34 KG/M2 | HEART RATE: 111 BPM | HEIGHT: 63 IN

## 2023-11-09 DIAGNOSIS — R30.0 DYSURIA: Primary | ICD-10-CM

## 2023-11-09 DIAGNOSIS — N76.0 VAGINITIS AND VULVOVAGINITIS: ICD-10-CM

## 2023-11-09 LAB
BILIRUB UR QL: NEGATIVE
COLOR UR: YELLOW
GLUCOSE UR-MCNC: NORMAL MG/DL
HGB UR QL STRIP.AUTO: NEGATIVE
KETONES UR-MCNC: NEGATIVE MG/DL
LEUKOCYTE ESTERASE UR QL STRIP.AUTO: 250
NITRITE UR QL STRIP.AUTO: NEGATIVE
PH UR: 7 [PH] (ref 5–8)
SP GR UR STRIP: 1.02 (ref 1–1.03)
UROBILINOGEN UR STRIP-ACNC: NORMAL

## 2023-11-09 PROCEDURE — 3008F BODY MASS INDEX DOCD: CPT | Performed by: STUDENT IN AN ORGANIZED HEALTH CARE EDUCATION/TRAINING PROGRAM

## 2023-11-09 PROCEDURE — 99203 OFFICE O/P NEW LOW 30 MIN: CPT | Performed by: STUDENT IN AN ORGANIZED HEALTH CARE EDUCATION/TRAINING PROGRAM

## 2023-11-09 PROCEDURE — 3074F SYST BP LT 130 MM HG: CPT | Performed by: STUDENT IN AN ORGANIZED HEALTH CARE EDUCATION/TRAINING PROGRAM

## 2023-11-09 PROCEDURE — 3078F DIAST BP <80 MM HG: CPT | Performed by: STUDENT IN AN ORGANIZED HEALTH CARE EDUCATION/TRAINING PROGRAM

## 2023-11-09 RX ORDER — CEPHALEXIN 500 MG/1
1 CAPSULE ORAL EVERY 8 HOURS
COMMUNITY
Start: 2023-11-04 | End: 2023-11-11

## 2023-11-09 NOTE — TELEPHONE ENCOUNTER
Pt went to the ER on Saturday, STD panel and given UTI medication. Pt still having burning sensation, and found out spouse was cheating on her.     Pls advise

## 2023-11-09 NOTE — TELEPHONE ENCOUNTER
Pt seen in the ER on Saturday for dysuria and possible STI. Pt was treated with Keflex for UTI. Gonorrhea and chlamydia was negative. Pt has finished the Keflex, but continues to have burning with urination. Pt denies fever. Pt wants to be seen today.  Pt scheduled to see Gardeniarosaura Escobedo today at 2:40 pm.

## 2023-11-10 LAB
BV BACTERIA DNA VAG QL NAA+PROBE: NEGATIVE
C GLABRATA DNA VAG QL NAA+PROBE: NEGATIVE
C KRUSEI DNA VAG QL NAA+PROBE: NEGATIVE
CANDIDA DNA VAG QL NAA+PROBE: POSITIVE
T VAGINALIS DNA VAG QL NAA+PROBE: NEGATIVE

## 2023-11-10 RX ORDER — CLOTRIMAZOLE AND BETAMETHASONE DIPROPIONATE 10; .64 MG/G; MG/G
1 CREAM TOPICAL 2 TIMES DAILY
Qty: 15 G | Refills: 0 | Status: SHIPPED | OUTPATIENT
Start: 2023-11-10

## 2024-02-22 ENCOUNTER — TELEPHONE (OUTPATIENT)
Dept: OBGYN CLINIC | Facility: CLINIC | Age: 29
End: 2024-02-22

## 2024-02-22 DIAGNOSIS — N63.10 MASS OF RIGHT BREAST, UNSPECIFIED QUADRANT: Primary | ICD-10-CM

## 2024-02-22 NOTE — TELEPHONE ENCOUNTER
Radiology needs order for diagnostic bilaterol mammogram due to mass in right breast.    Pls call when available to get pt scheduled.

## 2024-03-01 ENCOUNTER — HOSPITAL ENCOUNTER (OUTPATIENT)
Dept: MAMMOGRAPHY | Facility: HOSPITAL | Age: 29
Discharge: HOME | End: 2024-03-01
Attending: OBSTETRICS & GYNECOLOGY
Payer: MEDICAID

## 2024-03-01 DIAGNOSIS — N63.10 MASS OF RIGHT BREAST, UNSPECIFIED QUADRANT: ICD-10-CM

## 2024-03-01 PROCEDURE — 77066 DX MAMMO INCL CAD BI: CPT | Performed by: OBSTETRICS & GYNECOLOGY

## 2024-03-01 PROCEDURE — 76642 ULTRASOUND BREAST LIMITED: CPT | Performed by: OBSTETRICS & GYNECOLOGY

## 2024-03-01 PROCEDURE — 77062 BREAST TOMOSYNTHESIS BI: CPT | Performed by: OBSTETRICS & GYNECOLOGY

## 2024-03-04 ENCOUNTER — TELEPHONE (OUTPATIENT)
Dept: OBGYN CLINIC | Facility: CLINIC | Age: 29
End: 2024-03-04

## 2024-03-04 ENCOUNTER — LAB ENCOUNTER (OUTPATIENT)
Dept: LAB | Age: 29
End: 2024-03-04
Attending: OBSTETRICS & GYNECOLOGY
Payer: MEDICAID

## 2024-03-04 DIAGNOSIS — N92.6 MISSED MENSES: Primary | ICD-10-CM

## 2024-03-04 LAB
B-HCG SERPL-ACNC: <1 MIU/ML
PROGEST SERPL-MCNC: 0.76 NG/ML

## 2024-03-04 PROCEDURE — 84144 ASSAY OF PROGESTERONE: CPT | Performed by: OBSTETRICS & GYNECOLOGY

## 2024-03-04 PROCEDURE — 84702 CHORIONIC GONADOTROPIN TEST: CPT | Performed by: OBSTETRICS & GYNECOLOGY

## 2024-03-04 PROCEDURE — 36415 COLL VENOUS BLD VENIPUNCTURE: CPT | Performed by: OBSTETRICS & GYNECOLOGY

## 2024-03-04 NOTE — TELEPHONE ENCOUNTER
Patient called.. Missed menses FDLP 01/26/24. Pt has negative pregnancy but is requesting blood work to be ordered to confirm due to symptoms. Please call.

## 2024-03-04 NOTE — TELEPHONE ENCOUNTER
Patient verified name and      LMP 24, patient took UPT on Saturday and was negative. Patient wanting blood tests to confirm. HCG/Progesterone placed. Patient verbalized understanding and agreed.

## 2024-05-03 ENCOUNTER — TELEPHONE (OUTPATIENT)
Dept: OBGYN CLINIC | Facility: CLINIC | Age: 29
End: 2024-05-03

## 2024-05-03 DIAGNOSIS — Z87.59 HISTORY OF MISCARRIAGE: ICD-10-CM

## 2024-05-03 DIAGNOSIS — N92.6 MISSED MENSES: Primary | ICD-10-CM

## 2024-05-03 NOTE — TELEPHONE ENCOUNTER
Patient verified name and date of birth    Patient reports first day of last period 4/3/24  Reports regular cycles  History of miscarriage.  Patient informed of serial Hcg needed, once results are present we can schedule her for a Nurse Education if levels are appropriate. Verbalized understanding and agreed. Lab orders placed. Appointment on 5/7 cancelled.

## 2024-05-06 ENCOUNTER — LAB ENCOUNTER (OUTPATIENT)
Dept: LAB | Age: 29
End: 2024-05-06
Attending: OBSTETRICS & GYNECOLOGY
Payer: MEDICAID

## 2024-05-06 DIAGNOSIS — N92.6 MISSED MENSES: ICD-10-CM

## 2024-05-06 DIAGNOSIS — Z87.59 HISTORY OF MISCARRIAGE: ICD-10-CM

## 2024-05-06 LAB
B-HCG SERPL-ACNC: 195 MIU/ML
PROGEST SERPL-MCNC: 15.8 NG/ML

## 2024-05-06 PROCEDURE — 84144 ASSAY OF PROGESTERONE: CPT

## 2024-05-06 PROCEDURE — 84702 CHORIONIC GONADOTROPIN TEST: CPT

## 2024-05-06 PROCEDURE — 36415 COLL VENOUS BLD VENIPUNCTURE: CPT

## 2024-05-07 ENCOUNTER — HOSPITAL ENCOUNTER (EMERGENCY)
Age: 29
Discharge: HOME | End: 2024-05-07
Attending: EMERGENCY MEDICINE
Payer: MEDICAID

## 2024-05-07 ENCOUNTER — APPOINTMENT (OUTPATIENT)
Dept: ULTRASOUND IMAGING | Age: 29
End: 2024-05-07
Attending: EMERGENCY MEDICINE
Payer: MEDICAID

## 2024-05-07 VITALS
HEART RATE: 78 BPM | SYSTOLIC BLOOD PRESSURE: 117 MMHG | DIASTOLIC BLOOD PRESSURE: 73 MMHG | RESPIRATION RATE: 18 BRPM | OXYGEN SATURATION: 100 % | HEIGHT: 63 IN | WEIGHT: 190 LBS | BODY MASS INDEX: 33.66 KG/M2 | TEMPERATURE: 98 F

## 2024-05-07 DIAGNOSIS — O20.9 VAGINAL BLEEDING IN PREGNANCY, FIRST TRIMESTER (HCC): Primary | ICD-10-CM

## 2024-05-07 DIAGNOSIS — N92.6 MISSED MENSES: Primary | ICD-10-CM

## 2024-05-07 LAB
ALBUMIN SERPL-MCNC: 3.2 G/DL (ref 3.4–5)
ALBUMIN/GLOB SERPL: 0.8 {RATIO} (ref 1–2)
ALP LIVER SERPL-CCNC: 91 U/L
ALT SERPL-CCNC: 16 U/L
ANION GAP SERPL CALC-SCNC: 6 MMOL/L (ref 0–18)
AST SERPL-CCNC: 9 U/L (ref 15–37)
B-HCG SERPL-ACNC: 341 MIU/ML
B-HCG UR QL: POSITIVE
BASOPHILS # BLD AUTO: 0.04 X10(3) UL (ref 0–0.2)
BASOPHILS NFR BLD AUTO: 0.3 %
BILIRUB SERPL-MCNC: 0.2 MG/DL (ref 0.1–2)
BILIRUB UR QL STRIP.AUTO: NEGATIVE
BUN BLD-MCNC: 9 MG/DL (ref 9–23)
CALCIUM BLD-MCNC: 9.2 MG/DL (ref 8.5–10.1)
CHLORIDE SERPL-SCNC: 109 MMOL/L (ref 98–112)
CLARITY UR REFRACT.AUTO: CLEAR
CO2 SERPL-SCNC: 23 MMOL/L (ref 21–32)
COLOR UR AUTO: YELLOW
CREAT BLD-MCNC: 0.63 MG/DL
EGFRCR SERPLBLD CKD-EPI 2021: 122 ML/MIN/1.73M2 (ref 60–?)
EOSINOPHIL # BLD AUTO: 0.04 X10(3) UL (ref 0–0.7)
EOSINOPHIL NFR BLD AUTO: 0.3 %
ERYTHROCYTE [DISTWIDTH] IN BLOOD BY AUTOMATED COUNT: 16.3 %
GLOBULIN PLAS-MCNC: 4.1 G/DL (ref 2.8–4.4)
GLUCOSE BLD-MCNC: 91 MG/DL (ref 70–99)
GLUCOSE UR STRIP.AUTO-MCNC: NEGATIVE MG/DL
HCT VFR BLD AUTO: 34.1 %
HGB BLD-MCNC: 11.1 G/DL
IMM GRANULOCYTES # BLD AUTO: 0.05 X10(3) UL (ref 0–1)
IMM GRANULOCYTES NFR BLD: 0.4 %
KETONES UR STRIP.AUTO-MCNC: NEGATIVE MG/DL
LEUKOCYTE ESTERASE UR QL STRIP.AUTO: NEGATIVE
LYMPHOCYTES # BLD AUTO: 2.79 X10(3) UL (ref 1–4)
LYMPHOCYTES NFR BLD AUTO: 23.4 %
MCH RBC QN AUTO: 25.3 PG (ref 26–34)
MCHC RBC AUTO-ENTMCNC: 32.6 G/DL (ref 31–37)
MCV RBC AUTO: 77.7 FL
MONOCYTES # BLD AUTO: 0.87 X10(3) UL (ref 0.1–1)
MONOCYTES NFR BLD AUTO: 7.3 %
NEUTROPHILS # BLD AUTO: 8.12 X10 (3) UL (ref 1.5–7.7)
NEUTROPHILS # BLD AUTO: 8.12 X10(3) UL (ref 1.5–7.7)
NEUTROPHILS NFR BLD AUTO: 68.3 %
NITRITE UR QL STRIP.AUTO: NEGATIVE
OSMOLALITY SERPL CALC.SUM OF ELEC: 284 MOSM/KG (ref 275–295)
PH UR STRIP.AUTO: 7 [PH] (ref 5–8)
PLATELET # BLD AUTO: 275 10(3)UL (ref 150–450)
POTASSIUM SERPL-SCNC: 3.6 MMOL/L (ref 3.5–5.1)
PROT SERPL-MCNC: 7.3 G/DL (ref 6.4–8.2)
PROT UR STRIP.AUTO-MCNC: NEGATIVE MG/DL
RBC # BLD AUTO: 4.39 X10(6)UL
RBC UR QL AUTO: NEGATIVE
RH BLOOD TYPE: POSITIVE
SODIUM SERPL-SCNC: 138 MMOL/L (ref 136–145)
SP GR UR STRIP.AUTO: 1.02 (ref 1–1.03)
UROBILINOGEN UR STRIP.AUTO-MCNC: 1 MG/DL
WBC # BLD AUTO: 11.9 X10(3) UL (ref 4–11)

## 2024-05-07 PROCEDURE — 76801 OB US < 14 WKS SINGLE FETUS: CPT | Performed by: EMERGENCY MEDICINE

## 2024-05-07 PROCEDURE — 81003 URINALYSIS AUTO W/O SCOPE: CPT | Performed by: EMERGENCY MEDICINE

## 2024-05-07 PROCEDURE — 99284 EMERGENCY DEPT VISIT MOD MDM: CPT

## 2024-05-07 PROCEDURE — 81025 URINE PREGNANCY TEST: CPT

## 2024-05-07 PROCEDURE — 76817 TRANSVAGINAL US OBSTETRIC: CPT | Performed by: EMERGENCY MEDICINE

## 2024-05-07 PROCEDURE — 84702 CHORIONIC GONADOTROPIN TEST: CPT | Performed by: EMERGENCY MEDICINE

## 2024-05-07 PROCEDURE — 80053 COMPREHEN METABOLIC PANEL: CPT | Performed by: EMERGENCY MEDICINE

## 2024-05-07 PROCEDURE — 99285 EMERGENCY DEPT VISIT HI MDM: CPT

## 2024-05-07 PROCEDURE — 85025 COMPLETE CBC W/AUTO DIFF WBC: CPT | Performed by: EMERGENCY MEDICINE

## 2024-05-07 PROCEDURE — 96360 HYDRATION IV INFUSION INIT: CPT

## 2024-05-07 PROCEDURE — 86901 BLOOD TYPING SEROLOGIC RH(D): CPT | Performed by: EMERGENCY MEDICINE

## 2024-05-07 PROCEDURE — 86900 BLOOD TYPING SEROLOGIC ABO: CPT | Performed by: EMERGENCY MEDICINE

## 2024-05-08 NOTE — ED PROVIDER NOTES
Patient Seen in: ward Emergency Department In [place]      History     Chief Complaint   Patient presents with    Pregnancy Issues     Stated Complaint: Cramping yesterday, spotting started one hour ago. Patient with a hx of miscarr*    Subjective:   28-year-old female G5, P2 at approximate 4 weeks gestation with beta-hCG done yesterday of 195 who presents emergency room with abdominal cramping and light spotting.  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  Patient reports she was at 7 weeks with the she does.  Patient denies any fevers.    The history is provided by the patient.           Objective:   Past Medical History:    Amenorrhea    Anemia    Anemic in 2016; i feel like my levels are the same              Past Surgical History:   Procedure Laterality Date    Abdominal surgery  2014    Gallbladder removed    Removal gallbladder                  Social History     Socioeconomic History    Marital status:    Tobacco Use    Smoking status: Never     Passive exposure: Never    Smokeless tobacco: Never   Vaping Use    Vaping status: Never Used   Substance and Sexual Activity    Alcohol use: Not Currently     Comment: 2 times per month; a wine glass or two    Drug use: Never     Social Determinants of Health      Received from [facility], [facility]    Housing Stability              Review of Systems   Gastrointestinal:  Positive for abdominal pain.   Genitourinary:  Positive for vaginal bleeding.       Positive for stated complaint: Cramping yesterday, spotting started one hour ago. Patient with a hx of miscarr*  Other systems are as noted in HPI.  Constitutional and vital signs reviewed.      All other systems reviewed and negative except as noted above.    Physical Exam     ED Triage Vitals [05/07/24 2151]   BP (!) 87/67   Pulse 89   Resp 16   Temp 97.8 °F (36.6 °C)   Temp src    SpO2 100 %   O2 Device None (Room air)       Current Vitals:   Vital  Signs  BP: 117/73  Pulse: 78  Resp: 18  Temp: 97.8 °F (36.6 °C)    Oxygen Therapy  SpO2: 100 %  O2 Device: None (Room air)            Physical Exam  Vitals and nursing note reviewed.   Constitutional:       General: She is not in acute distress.     Appearance: She is not toxic-appearing.   HENT:      Head: Normocephalic and atraumatic.   Eyes:      Extraocular Movements: Extraocular movements intact.      Pupils: Pupils are equal, round, and reactive to light.   Cardiovascular:      Rate and Rhythm: Normal rate and regular rhythm.      Pulses: Normal pulses.   Pulmonary:      Effort: Pulmonary effort is normal.      Breath sounds: Normal breath sounds.   Abdominal:      General: Bowel sounds are normal. There is no distension.      Palpations: Abdomen is soft.      Tenderness: There is no abdominal tenderness.   Musculoskeletal:         General: Normal range of motion.   Skin:     General: Skin is warm.      Capillary Refill: Capillary refill takes less than 2 seconds.   Neurological:      General: No focal deficit present.      Mental Status: She is alert and oriented to person, place, and time.      Cranial Nerves: No cranial nerve deficit.   Psychiatric:         Mood and Affect: Mood normal.         Behavior: Behavior normal.               ED Course     Labs Reviewed   COMP METABOLIC PANEL (14) - Abnormal; Notable for the following components:       Result Value    AST 9 (*)     Albumin 3.2 (*)     A/G Ratio 0.8 (*)     All other components within normal limits   HCG, BETA SUBUNIT (QUANT PREGNANCY TEST) - Abnormal; Notable for the following components:    Hcg Quantitative 341.0 (*)     All other components within normal limits   URINALYSIS WITH CULTURE REFLEX - Abnormal; Notable for the following components:    Urobilinogen Urine 1.0 (*)     All other components within normal limits   POCT PREGNANCY URINE - Abnormal; Notable for the following components:    POCT Urine Pregnancy Positive (*)     All other  components within normal limits   CBC W/ DIFFERENTIAL - Abnormal; Notable for the following components:    WBC 11.9 (*)     HGB 11.1 (*)     HCT 34.1 (*)     MCV 77.7 (*)     MCH 25.3 (*)     Neutrophil Absolute Prelim 8.12 (*)     Neutrophil Absolute 8.12 (*)     All other components within normal limits   CBC WITH DIFFERENTIAL WITH PLATELET    Narrative:     The following orders were created for panel order CBC With Differential With Platelet.  Procedure                               Abnormality         Status                     ---------                               -----------         ------                     CBC W/ DIFFERENTIAL[[id number]]          Abnormal            Final result                 Please view results for these tests on the individual orders.   ABORH (BLOOD TYPE)             US PREG 1ST TRIM W/EV (CPT=76801/10412)    Result Date: 5/7/2024  PROCEDURE:  US OB W/ EV 1ST TRIMESTER (CPT=76801/88592)  COMPARISON:  None.  INDICATIONS:  Cramping yesterday, spotting started one hour ago. Patient with a hx of miscarriages, currently 4-6 wks.  TECHNIQUE:  Transabdominal and endovaginal pelvic ultrasound examinations were performed.  PATIENT STATED HISTORY: (As transcribed by Technologist)  Patient stated cramping and spotting for two days, history of miscarriages.      FINDINGS:  GESTATIONAL SAC:  Not identified. FETAL POLE:  Not identified. YOLK SAC:  Not identified. CARDIAC ACTIVITY:  Not identified. UTERUS:  Uterus measures 9.1 x 4.9 x 5.5 cm.  Endometrial stripe thickness is 1.4 cm.  Uterus is otherwise unremarkable. PLACENTA:  Not identified.  RIGHT OVARY:  Right ovary could not be visualized due to overlying bowel gas. LEFT OVARY:  Left ovary measures 2.6 x 1.7 x 2.1 cm and is otherwise unremarkable. CUL-DE-SAC:  Normal. CLINICAL AGE:  Normal SONOGRAPHIC AGE:  Normal OTHER:  Negative.            CONCLUSION:  1. There is no intrauterine pregnancy detected.  If pregnancy test remains positive then  short interval follow-up ultrasound is recommended. 2. Right ovary could not be visualized due to overlying bowel gas. 3. Pelvis is otherwise sonographically unremarkable.   LOCATION:  [facility]   Dictated by (CST): Tho Mortensen MD on 5/07/2024 at 11:33 PM     Finalized by (CST): Tho Mortensen MD on 5/07/2024 at 11:35 PM               MDM      Social -negative tobacco, negative etoh, negative drugs  Family History-noncontributory  Past Medical History-anemia, amenorrhea, gallbladder removal    Differential diagnosis before testing included miscarriage, UTI, early pregnancy, ectopic pregnancy    Co-morbidities that add to the complexity of management include: 2 prior miscarriages    Testing ordered during this visit included baseline labs pregnancy test, blood type, beta-hCG, ultrasound    Radiographic images  I personally reviewed the radiographs and my individual interpretation shows unable to visualize intrauterine pregnancy  I also reviewed the official reports that showed US PREG 1ST TRIM W/EV (CPT=76801/13820)    Result Date: 5/7/2024  PROCEDURE:  US OB W/ EV 1ST TRIMESTER (CPT=76801/80191)  COMPARISON:  None.  INDICATIONS:  Cramping yesterday, spotting started one hour ago. Patient with a hx of miscarriages, currently 4-6 wks.  TECHNIQUE:  Transabdominal and endovaginal pelvic ultrasound examinations were performed.  PATIENT STATED HISTORY: (As transcribed by Technologist)  Patient stated cramping and spotting for two days, history of miscarriages.      FINDINGS:  GESTATIONAL SAC:  Not identified. FETAL POLE:  Not identified. YOLK SAC:  Not identified. CARDIAC ACTIVITY:  Not identified. UTERUS:  Uterus measures 9.1 x 4.9 x 5.5 cm.  Endometrial stripe thickness is 1.4 cm.  Uterus is otherwise unremarkable. PLACENTA:  Not identified.  RIGHT OVARY:  Right ovary could not be visualized due to overlying bowel gas. LEFT OVARY:  Left ovary measures 2.6 x 1.7 x 2.1 cm and is otherwise unremarkable. CUL-DE-SAC:  Normal.  CLINICAL AGE:  Normal SONOGRAPHIC AGE:  Normal OTHER:  Negative.            CONCLUSION:  1. There is no intrauterine pregnancy detected.  If pregnancy test remains positive then short interval follow-up ultrasound is recommended. 2. Right ovary could not be visualized due to overlying bowel gas. 3. Pelvis is otherwise sonographically unremarkable.   LOCATION:  [facility]   Dictated by (CST): Tho Mortensen MD on 5/07/2024 at 11:33 PM     Finalized by (CST): Tho Mortensen MD on 5/07/2024 at 11:35 PM          External chart review showed review of care everywhere in epic system shows no related comorbidities to current presentation    History obtained by an independent source included from patient    Discussion of management with patient    Social determinants of health that affect care include no listed primary care physician however patient states she does see an [facility]      Medications Provided: IV fluids    Course of Events during Emergency Room Visit include 1-year-old female presents emergency room for vaginal bleeding and spotting with cramping.  Patient is approximately 4 to 5 weeks pregnancy based on LMP.  Her beta-hCG is very early her blood beta-hCG that was done yesterday was 195.  I explained to the patient that generally with his earlier if needed she will not be able to visualize a pregnancy on ultrasound and would recommend close follow-up with her OB/GYN for repeat ultrasound and beta-hCG testing.  Patient expresses understanding.  Will check urinalysis and baseline labs as well.  She does not require RhoGAM.    Patient's beta-hCG has increased appropriately since being checked 1 day ago however her level is so low that I did not expect to see anything on ultrasound and the ultrasound does not show a live IUP at this time does not show anything intrauterine yet however I would not expect to see until it was in the level much higher than what she is currently have her beta-hCG.  Patient will be  informed of this and follow-up with her OBG YN closely for repeat imaging and beta-hCG's.      Disposition:          Discharge  I have discussed with the patient the results of test, differential diagnosis, treatment plan, warning signs and symptoms which should prompt immediate return.  They expressed understanding of these instructions and agrees to the following plan provided.  They were given written discharge instructions and agrees to return for any concerns and voiced understanding and all questions were answered.                                      Medical Decision Making      Disposition and Plan     Clinical Impression:  1. Vaginal bleeding in pregnancy, first trimester (HCC)         Disposition:  Discharge  5/7/2024 11:46 pm    Follow-up:  Isabell Floyd DO  [address] [place] [address] [id number] [phone]    Schedule an appointment as soon as possible for a visit      Mallory Quintero MD  [address] [place] [id number] [phone]    Schedule an appointment as soon as possible for a visit            Medications Prescribed:  Current Discharge Medication List

## 2024-05-09 NOTE — TELEPHONE ENCOUNTER
Patient calling to follow up to get appointment scheduled, sates she has slight cramping. Please advise

## 2024-05-09 NOTE — TELEPHONE ENCOUNTER
Patient verified name and date of birth.    Patient requesting an appointment to follow up after the emergency room visit. Patient requesting a late afternoon appointment. Scheduled 5/16 with Dr. Andersen. Patient informed of scheduling details.

## 2024-05-16 ENCOUNTER — TELEPHONE (OUTPATIENT)
Dept: OBGYN CLINIC | Facility: CLINIC | Age: 29
End: 2024-05-16

## 2024-05-16 NOTE — TELEPHONE ENCOUNTER
Patient verified name and date of birth.    Patient was scheduled today for ER follow up due to bleeding in early pregnancy. Patient states it's difficult for her to get time off work. Patient informed this nurse she went to an outside clinic, and got an ultrasound. States bleeding has resolved. Patient informed we need records prior to scheduling Nurse Education, provided fax number. Patient will have records faxed. We will call her upon receiving.

## 2024-05-20 NOTE — TELEPHONE ENCOUNTER
Patient believes records were faxed over on Thursday or Friday of last week.    Patient interested in transfer of prenatal care.  Pls advise if received and any news    Pls advise

## 2024-05-21 NOTE — TELEPHONE ENCOUNTER
TRANSFER OF CARE FOR CHEST PAIN       PRIMARY CARE PHYSICIAN:   Cristobal Puga DO     Consulting md: cora esparza md (mellissa dsouza md)  Requesting provider: Isidro Morales MD    HISTORY OF PRESENT ILLNESS:   Patient is a 55 year old male with a significant history of myla, cad - stent to lad, HFrEF, hypertension, dyslipidemia, and dm who developed a sharp pain over middle of his chest that extends to his neck, back arm (not his jaw) with associated sob, palpitations and diaphoresis (no nausea, vomiting, numbness or tingling). Pt states that this sharp pain started 3 days ago, it occurs at rest and exertion, it increases with palpation / coughing (nonproductive cough) and decreases with rest and inspiration. Pain is continuous, pt has this chest pain now. Pt has c/o dizziness that started on Monday, he also has c/o intermittent palpitations.     Prior to stent placement to the lad, pt had c/o exertional chest pain with associated sob. The chest pain pt is experiencing now is new    Pt denies any syncopal episodes, lightheadedness, sob at rest, leiva, orthopnea, fluid retention, pnd, claudication or signs of overt bleeding.      PAST MEDICAL HISTORY:     DMII (diabetes mellitus, type 2) (CMS/[facility])                    Hyperlipidemia                                                Depression                                                    Lumbar spondylosis                                            Hypertension                                                  Vitamin D deficiency                                          Morbid obesity with BMI of 40.0-44.9, adult (C* 12/22/2014    Hypertriglyceridemia                            2/15/2015     CAD (coronary artery disease)                   2011            Comment: History of angioplasty/stents    Gastroesophageal reflux disease                               Anxiety                                                       Pneumonia                              Patient verified name and date of birth.     Patient informed ultrasound report was received. Based on results they were recommending repeat ultrasound. Patient would like to initiate prenatal care, due to work schedule. Patient informed she can be booked as a GYN patient and discuss ultrasound results and further recommendations at that visit. Verbalized understanding and agreed. Scheduled with Dr. Andersen 5/28/24. Aware of scheduling details.                           Mild cognitive impairment                       8/3/2017      VRE (vancomycin-resistant Enterococci)          2016            Comment: Urine    Wears glasses                                                   Comment: reading only    CARLOTTA (obstructive sleep apnea)                   2018            Comment: uses CPAP    Shingles                                        2017            Comment: right side of body    MEDICATIONS:   Outpatient Medications Marked as Taking for the 9/24/19 encounter (Hospital Encounter)   Medication Sig Dispense Refill   • glimepiride (AMARYL) 2 MG tablet TAKE 1 TABLET BY MOUTH DAILY (BEFORE BREAKFAST) 30 tablet 4   • metoPROLOL tartrate (LOPRESSOR) 100 MG tablet TAKE 1 TABLET TWICE A DAY 60 tablet 3   • clopidogrel (PLAVIX) 75 MG tablet TAKE 1 TABLET BY MOUTH DAILY 30 tablet 3   • furosemide (LASIX) 20 MG tablet TAKE 1 TABLET EACH MORNING 30 tablet 3   • NOVOLOG FLEXPEN 100 UNIT/ML pen-injector 26-38 UNITS (SLIDING SCALE)  UNITS 3 TIMES A DAY BEFORE MEALS 15 mL 5   • insulin glargine (LANTUS SOLOSTAR) 100 UNIT/ML pen-injector Inject 50 Units into the skin 2 times daily. Prime 2 units first before each administration. 15 mL 5   • LYRICA 50 MG capsule TAKE 1 CAPSULE BY MOUTH AT BEDTIME. 30 capsule 2   • hydrALAZINE (APRESOLINE) 10 MG tablet TAKE 1 TABLET TWICE A DAY 60 tablet 6   • atorvastatin (LIPITOR) 40 MG tablet TAKE 1 TABLET DAILY 30 tablet 3   • ondansetron (ZOFRAN ODT) 4 MG disintegrating tablet Place 1 tablet onto the tongue every 6 hours as needed for Nausea. 20 tablet 0   • diphenhydrAMINE (BENADRYL ALLERGY) 25 MG tablet Take 1 tablet by mouth 3 times daily as needed for Itching. 30 tablet 0   • TRADJENTA 5 MG tablet TAKE 1 TABLET BY MOUTH DAILY 30 tablet 3   • losartan-hydrochlorothiazide (HYZAAR) 100-12.5 MG per tablet TAKE 1 TABLET DAILY 30 tablet 6   • aspirin 81 MG tablet Take 1 tablet by mouth daily. 30 tablet 6   • sertraline (ZOLOFT) 100 MG tablet  TAKE 1 TABLET DAILY 30 tablet 5   • gemfibrozil (LOPID) 600 MG tablet TAKE 1 TABLET AT BEDTIME 30 tablet 5   • pantoprazole (PROTONIX) 40 MG tablet TAKE 1 TABLET TWICE A  tablet 1   • terazosin (HYTRIN) 5 MG capsule TAKE 1 CAPSULE NIGHTLY 30 capsule 5   • isosorbide mononitrate (IMDUR) 120 MG 24 hr tablet TAKE 1 TABLET DAILY 30 tablet 11   • cholecalciferol (VITAMIN D3) 1000 UNITS tablet Take 2,000 Units by mouth daily. Dose: 2 tabs (= 2,000 units)       Pt states that he takes his medications daily as prescribed.    Inpatient medications:    • aspirin  81 mg Oral Daily   • atorvastatin  40 mg Oral Daily   • clopidogrel  75 mg Oral Daily   • linaGLIPtin  5 mg Oral Daily   • doxazosin  4 mg Oral Nightly   • sertraline  100 mg Oral Daily   • pantoprazole  40 mg Oral 2 times per day   • metoPROLOL tartrate  100 mg Oral 2 times per day   • pregabalin  50 mg Oral QHS   • isosorbide mononitrate  120 mg Oral Daily   • insulin glargine  50 Units Subcutaneous 2 times per day   • hydrALAZINE  10 mg Oral 2 times per day   • gemfibrozil  600 mg Oral QHS   • furosemide  20 mg Oral QAM   • sodium chloride (PF)  2 mL Intracatheter 2 times per day   • heparin (porcine)  5,000 Units Subcutaneous 3 times per day   • sodium chloride (PF)  2 mL Intracatheter 2 times per day   • insulin lispro   Subcutaneous TID AC       ALLERGIES:   Allergies as of 09/24/2019   • (No Known Allergies)       PAST SURGICAL HISTORY:    FOOT SURGERY                                    7/2015          Comment: bone spur removed    CORONARY ANGIOPLASTY WITH STENT PLACEMENT       9/2011          Comment: stents placed to LAD?    PTCA                                            6/2014          Comment: cath only-chronic occluded LAD, other                coronaries OK    INCISION AND DRAINAGE                           3/27/2016       Comment: Macario Khanna. [facility]    INCISION AND DRAINAGE                           6/24/2016       Comment: Macario Wheeler  [place]. [facility]    REMOVAL GALLBLADDER                                           ARTHROSCOPY KNEE MEDIAL&LATERA                  12/04/2018      Comment: Dr Khanna. [facility]    APPENDECTOMY                                                  PAST FAMILY HISTORY:   Family History   Problem Relation Age of Onset   • Hypertension Mother    • Diabetes Mother    • Cancer Mother         leukemia   • Depression Mother    • Asthma Mother    • Arthritis Mother    • Heart disease Mother    • High cholesterol Mother    • Asthma Father    • Arthritis Father    • Hypertension Father    • High cholesterol Father    • Cancer Father         bone   • Diabetes Father    • Heart disease Father         CAD   • Depression Sister    • Diabetes Sister    • Hypertension Sister    • High cholesterol Sister    • Gastrointestinal Sister    • Cancer Sister 57        lymph node       PAST SOCIAL HISTORY:   Social History     Socioeconomic History   • Marital status: Single     Spouse name: Not on file   • Number of children: Not on file   • Years of education: Not on file   • Highest education level: Not on file   Occupational History   • Not on file   Social Needs   • Financial resource strain: Not on file   • Food insecurity:     Worry: Not on file     Inability: Not on file   • Transportation needs:     Medical: Not on file     Non-medical: Not on file   Tobacco Use   • Smoking status: Never Smoker   • Smokeless tobacco: Never Used   Substance and Sexual Activity   • Alcohol use: Yes     Alcohol/week: 1.0 standard drinks     Types: 1 Glasses of wine per week     Frequency: 2-3 times a week     Drinks per session: 1 or 2     Binge frequency: Never     Comment: occassionally   • Drug use: No   • Sexual activity: Never   Lifestyle   • Physical activity:     Days per week: Not on file     Minutes per session: Not on file   • Stress: Not on file   Relationships   • Social connections:     Talks on phone: Not on file     Gets together: Not on file      Attends Alevism service: Not on file     Active member of club or organization: Not on file     Attends meetings of clubs or organizations: Not on file     Relationship status: Not on file   • Intimate partner violence:     Fear of current or ex partner: Not on file     Emotionally abused: Not on file     Physically abused: Not on file     Forced sexual activity: Not on file   Other Topics Concern   • Not on file   Social History Narrative   • Not on file     Pt lives at home with his sister, he is not working (on disability). Pt assists with house work, laundry and shopping. He walks a mile daily (chest pain does occur with these activities). Pt has alcohol on occasion, denies any use of tobacco or illegal drugs.      REVIEW OF SYSTEMS:   Constitutional: There is no history of fevers or chills  Eyes: Patient denies blurred vision  ENMT: No history of ear pain  Cardiovascular: see hpi  Respiratory: There is no history of shortness of breath  Gastrointestinal: No history of abdominal pain, nausea or vomiting  Genitourinary: There is no history of dysuria  Musculoskeletal: Denies history of new joint pain   Skin: No history of skin rashes  Neurologic: No history of numbness, tingling or weakness  All other systems that were reviewed are negative    PHYSICAL EXAMINATION:   VITAL SIGNS:    Visit Vitals  /65 (BP Location: RUE - Right upper extremity, Patient Position: Supine)   Pulse 54   Temp 98.5 °F (36.9 °C) (Oral)   Resp 18   Ht 5' 8\" (1.727 m)   Wt 125 kg   SpO2 93%   BMI 41.90 kg/m²     GENERAL: This is a 55 year old male in no acute distress  PSYCHIATRIC: He is awake alert and oriented to time, place and person. Mood and affect are appropriate.  HEAD: Appears to be atraumatic and normocephalic.   EYES: Reveal no icterus scleral injection or conjunctival pallor. Pupils   equal, round and reactive to light and accommodation.  Extraocular movements appear to be intact  THROAT: Oropharyngeal exam reveals moist  oral mucosa. There is no erythema, discharge or thrush. Dentition is good  NECK: Supple and symmetric. There is no JVD (jugular venous distension) or thyromegaly .    LUNGS: Reveals good effort and lungs are clear to auscultation bilaterally. There are no wheezes or rhonchi.  HEART: No thrills. Reveals presence of first and second heart sounds. Regular rate and rhythm. No murmurs, rubs or gallops.  ABDOMEN:  Normoactive bowel sounds. Soft and nontender. There is no rebound tenderness.  There is no hepatosplenomegaly.  EXTREMITIES: No clubbing, cyanosis or edema  NEUROLOGIC: Cranial nerves III through XII appear grossly intact. Sensation is intact.  SKIN: Appears unremarkable and no rashes are present.    LABS:  Lab Results   Component Value Date    SODIUM 133 (L) 07/22/2019    POTASSIUM 4.0 07/22/2019    CHLORIDE 100 07/22/2019    CO2 24 07/22/2019    GLUCOSE 481 (HH) 07/22/2019    BUN 19 07/22/2019    CREATININE 0.97 07/22/2019    ALBUMIN 3.7 09/24/2019    BILIRUBIN 0.3 09/24/2019    LACTA 1.3 06/01/2016    AST 57 (H) 09/24/2019    INR 1.0 09/24/2019     Lab Results   Component Value Date    PTT 28 09/24/2019    WBC 7.2 09/24/2019    HGB 13.5 09/24/2019    HCT 40.6 09/24/2019     09/24/2019    BNP 21 07/06/2018    MMB 1.0 12/16/2013    MYOGLOBIN 48 04/07/2013    RAPDTR <0.02 09/25/2019    CPK 64 12/18/2013    CRP <0.3 11/07/2016    TSH 1.020 06/13/2019     Cardiac Enzymes  Recent Labs   Lab 09/25/19  0435 09/24/19  2330 09/24/19  1832   RAPDTR <0.02 <0.02 <0.02       CHOLESTEROL (mg/dL)   Date Value   03/13/2019 117     HDL (mg/dL)   Date Value   03/13/2019 34 (L)     CHOL/HDL (no units)   Date Value   03/13/2019 3.4     TRIGLYCERIDE (mg/dL)   Date Value   03/13/2019 162 (H)     CALCULATED LDL (mg/dL)   Date Value   03/13/2019 51       DIAGNOSTICS:   Echo 03.13.2019  Normal LV size. Low-normal LV systolic function, LVEF 50%  Normal right ventricular size and systolic function.  No significant valvular  abnormalities  Agitated saline was injected through a peripheral vein and did not show evidence of a shunt.  Trivial pericardial effusion  No significant change since the prior study.    Stress test 06.04.2019  1) EKG stress portion showed  non-diagnostic    2) Postexercise global LV ejection fraction was calculated at 48%.    3) Nuclear images showed abnormal as above with reversible defect seen in the anterior apical region as well as mild reversibility in the inferior and lateral wall    Cardiac catheterization 02.15.2017  52-year-old gentleman with chronic CAD  Remote occlusion of the proximal LAD  Receives very nice collaterals from the right coronary and circumflex  No other obstructive CAD is identified  Monitor reduced left ventricular systolic function with global hypokinesis    12 lead ekg 09.25.2019 - nsr with nonspecific t wave changes    Telemetry - nsr, heart rate in the 60's - 70's    Ct angiogram of chest abdomen pelvis 09.24.2019  1.  No evidence of pulmonary embolism.  2.  Hepatic steatosis.  3.  Severe coronary atherosclerosis.    Us liver gallbladder pancreas 09.24.2019  Hepatomegaly and hepatic steatosis.    Assessment / plan:  · Cad - hx of stent to lad: pt is having chest pain with rest/exertion. Pain decreases with inspiration and rest, increases with palpation. pt currently has this chest pain. Troponin and 12 lead ekg are unremarkable. Stress test completed on 06.04.2019 demonstrated \"reversible defect seen in the anterior apical region as well as mild reversibility in the inferior and lateral wall\". Cardiac catheterization demonstrating a remote occlusion of lad with collaterals from right coronary and circumflex arteries. Ct angio of chest is negative for pulmonary emboli. Plan to control bp, increase pt's activity to assess for exertional chest pain. Cont medical management with asa/plavix/bb/statin/imdur.  · HFrEF (chronic): pt is not demonstrating any overt signs of heart failure.   recent echo noted. Cont medical management with bb/lasix - start arb  · Hypertension: elevated bp noted, cont to monitor and adjust medications as needed.  · Dyslipidemia: pt is on stating/gemfibrozil    · myla (managed by primary team): pt is compliant with cpap  · dm (managed by primary team): pt is on insulin regimen / oral medications  · Hepatic steatosis (managed by primary team): results of ct of abdomen pelvis        Thank you for involving us in the care of this interesting patient, we will follow closely with you    Joyce Dean NP  9/25/2019   - covering Dr No    Patient seen and examined . Agree with the history and physical, past medical history, surgical history, social history, and review of systems. Medication list, vitals, and labs reviewed. Agree with the assessment and plan, which was formulated by me.  Lungs - clear b/l , no rales   cvs - regular , no s3/s4   abd - soft   Atypical CP which is pleuritic and positional   Neck pain is also positional   Troponin negative   Known  Occluded lad with collaterals   Recent stress test seen  Adequate BP control - add ace   If stable followup out pt with Dr Oneal Rodriguez MD  9/25/2019  12:22 PM

## 2024-06-17 ENCOUNTER — TELEPHONE (OUTPATIENT)
Dept: OBGYN CLINIC | Facility: CLINIC | Age: 29
End: 2024-06-17

## 2024-06-17 NOTE — TELEPHONE ENCOUNTER
Name and Date of birth verified      Patient informed that New OB labs will be ordered at nurse eduction appointment. Patient notes she has a history of miscarriage and would like HCG.   Patient did have a repeat ultrasound done. Advised patient to have these faxed to office. Advised for assessment in office before lab work to be done.

## 2024-06-17 NOTE — TELEPHONE ENCOUNTER
Patient has initial Ob appointment on 6/19. Patient wanted to know if she would be able to have lab work done prior to appointment. Would need orders for them. Please call.

## 2024-06-19 ENCOUNTER — TELEPHONE (OUTPATIENT)
Dept: OBGYN CLINIC | Facility: CLINIC | Age: 29
End: 2024-06-19

## 2024-06-19 ENCOUNTER — OFFICE VISIT (OUTPATIENT)
Dept: OBGYN CLINIC | Facility: CLINIC | Age: 29
End: 2024-06-19

## 2024-06-19 VITALS
SYSTOLIC BLOOD PRESSURE: 106 MMHG | HEIGHT: 63 IN | HEART RATE: 82 BPM | BODY MASS INDEX: 37.56 KG/M2 | DIASTOLIC BLOOD PRESSURE: 71 MMHG | WEIGHT: 212 LBS

## 2024-06-19 DIAGNOSIS — O99.211 OBESITY AFFECTING PREGNANCY IN FIRST TRIMESTER, UNSPECIFIED OBESITY TYPE (HCC): Primary | ICD-10-CM

## 2024-06-19 PROCEDURE — 3008F BODY MASS INDEX DOCD: CPT | Performed by: OBSTETRICS & GYNECOLOGY

## 2024-06-19 PROCEDURE — 3074F SYST BP LT 130 MM HG: CPT | Performed by: OBSTETRICS & GYNECOLOGY

## 2024-06-19 PROCEDURE — 3078F DIAST BP <80 MM HG: CPT | Performed by: OBSTETRICS & GYNECOLOGY

## 2024-06-19 RX ORDER — PNV 85/IRON/FOLIC/DHA/FISH OIL 40-10-1 MG
1 CAPSULE ORAL DAILY
Qty: 90 CAPSULE | Refills: 2 | Status: SHIPPED | OUTPATIENT
Start: 2024-06-19 | End: 2024-06-24

## 2024-06-19 RX ORDER — PYRIDOXINE HCL (VITAMIN B6) 25 MG
25 TABLET ORAL 3 TIMES DAILY PRN
Qty: 60 TABLET | Refills: 3 | Status: SHIPPED | OUTPATIENT
Start: 2024-06-19

## 2024-06-19 RX ORDER — MULTIVIT-MIN/IRON/FOLIC ACID/K 18-600-40
1 CAPSULE ORAL DAILY
Qty: 90 CAPSULE | Refills: 2 | Status: SHIPPED | OUTPATIENT
Start: 2024-06-19 | End: 2025-03-16

## 2024-06-19 RX ORDER — CALCIUM CARBONATE 500(1250)
500 TABLET ORAL DAILY
Qty: 90 TABLET | Refills: 2 | Status: SHIPPED | OUTPATIENT
Start: 2024-06-19 | End: 2024-07-19

## 2024-06-19 RX ORDER — FERROUS SULFATE 325(65) MG
325 TABLET ORAL EVERY OTHER DAY
Qty: 90 TABLET | Refills: 3 | Status: SHIPPED | OUTPATIENT
Start: 2024-06-19

## 2024-06-19 NOTE — PROGRESS NOTES
Yoly Petty is a 28 year old female  Patient's last menstrual period was 2024 (approximate).   Chief Complaint   Patient presents with    Gyn Exam     Pt presents to discuss recent ultrasound results, requesting prenatal vitamins and refill on metformin    Pt for pregnancy visit. Vomiting. No bleeding.     OBSTETRICS HISTORY:     OB History    Para Term  AB Living   4 2 2   1 2   SAB IAB Ectopic Multiple Live Births   1       2      # Outcome Date GA Lbr Roberto Carlos/2nd Weight Sex Type Anes PTL Lv   4 Current            3 Term 16 39w0d  6 lb 3 oz (2.807 kg) M Vag-Spont   EDMUNDO      Birth Comments: vaginal lac, labial lac   2 Term    6 lb 8 oz (2.948 kg) M Vag-Spont   EDMUNDO   1 SAB                GYNE HISTORY:     Hx Prior Abnormal Pap: Yes (7 years ago)  Pap Date: 23  Pap Result Notes: Normal   Menarche: 13 y/o (2024 10:05 AM)  Period Cycle (Days): irregular (2024 10:05 AM)  Period Duration (Days): 7 days (2024 10:05 AM)  Period Flow: Normal (2024 10:05 AM)  Use of Birth Control (if yes, specify type): None (2024 10:05 AM)  Hx Prior Abnormal Pap: Yes (7 years ago) (2024 10:05 AM)  Pap Date: 23 (2024 10:05 AM)  Pap Result Notes: Normal (2024 10:05 AM)        Latest Ref Rng & Units 3/28/2023     8:52 PM   RECENT PAP RESULTS   Thinprep Pap Negative for intraepithelial lesion or malignancy Negative for intraepithelial lesion or malignancy    HPV Negative Negative          MEDICAL HISTORY:     Past Medical History:    Amenorrhea    Anemia    Anemic in 2016; i feel like my levels are the same    Diabetes mellitus (HCC)       SURGICAL HISTORY:     Past Surgical History:   Procedure Laterality Date    Removal gallbladder  2014       SOCIAL HISTORY:     Social History     Socioeconomic History    Marital status:    Tobacco Use    Smoking status: Never     Passive exposure: Never    Smokeless tobacco: Never   Vaping Use    Vaping status:  Never Used   Substance and Sexual Activity    Alcohol use: Not Currently     Comment: 2 times per month; a wine glass or two    Drug use: Never     Social Determinants of Health      [facility]    Housing Stability        FAMILY HISTORY:     Family History   Problem Relation Age of Onset    Cancer Father         Colon cancer; early detenction; partial colon removal    Diabetes Father     Hypertension Mother     Cancer Paternal Grandfather         Colon cancer; passed away from it       MEDICATIONS:       Current Outpatient Medications:     metFORMIN 500 MG Oral Tab, , Disp: , Rfl:     Xcjknt-LqTgo-HkZvdLo-FA-Fish (OB COMPLETE ONE) 50-1-476 MG Oral Cap, Take 1 tablet by mouth daily., Disp: 90 capsule, Rfl: 2    Pyridoxine HCl 25 MG Oral Tab, Take 1 tablet (25 mg total) by mouth 3 (three) times daily as needed., Disp: 60 tablet, Rfl: 3    Ferrous Sulfate 325 (65 Fe) MG Oral Tab, Take 1 tablet (325 mg total) by mouth every other day., Disp: 90 tablet, Rfl: 3    Cholecalciferol (VITAMIN D) 50 MCG (2000 UT) Oral Cap, Take 1 capsule (2,000 Units total) by mouth daily., Disp: 90 capsule, Rfl: 2    Calcium 500 MG Oral Tab, Take 500 mg by mouth daily., Disp: 90 tablet, Rfl: 2    clotrimazole-betamethasone (LOTRISONE) 1-0.05 % External Cream, Apply 1 Application topically 2 (two) times daily., Disp: 15 g, Rfl: 0    Norgestimate-Eth Estradiol 0.25-35 MG-MCG Oral Tab, Take 1 tablet by mouth daily. (Patient not taking: Reported on 5/7/2024), Disp: 84 tablet, Rfl: 4    ALLERGIES:     No Known Allergies      REVIEW OF SYSTEMS:     Constitutional:    denies fever / chills  Eyes:     denies blurred or double vision  Cardiovascular:  denies chest pain or palpitations  Respiratory:    denies shortness of breath  Gastrointestinal:  denies severe abdominal pain, frequent diarrhea or constipation, nausea / vomiting  Genitourinary:    denies dysuria, bothersome  incontinence  Skin/Breast:   denies any breast pain, lumps, or discharge  Neurological:    denies frequent severe headaches  Psychiatric:   denies depression or anxiety, thoughts of harming self or others  Heme/Lymph:    denies easy bruising or bleeding      PHYSICAL EXAM:   Blood pressure 106/71, pulse 82, height 5' 3\" (1.6 m), weight 212 lb (96.2 kg), last menstrual period 04/03/2024.  Constitutional:  well developed, well nourished  Head/Face:  normocephalic  Neck/Thyroid: thyroid symmetric, no thyromegaly, no nodules, no adenopathy  Lymphatic: no abnormal supraclavicular or axillary adenopathy is noted  Respiratory:      chest wall symmetric and nontender on palpation, clear to asculation bilateral, no wheezing, rales, ronchi, and resonance normal upon percussion  Cardiovascular: chest normal in appearance, regular rate and rhythm, no murmurs, PMI palpated midclavicular line  Abdomen:   soft, nontender, nondistended, no masses  Skin/Hair:  no unusual rashes or bruises  Extremities:  no edema, no cyanosis, non tender bilaterally  Psychiatric:   oriented to time, place, person and situation. Appropriate mood and affect    Bs ultrasound live iup    ASSESSMENT & PLAN:     Yoly was seen today for gyn exam.    Diagnoses and all orders for this visit:    Obesity affecting pregnancy in first trimester, unspecified obesity type (HCC)  -     CBC W Differential W Platelet; Future  -     Rubella, IGG; Future  -     Antibody Screen; Future  -     T PALLIDUM SCREENING CASCADE; Future  -     Hepatitis B Surface Antigen; Future  -     Blood Type, ABO And Rh D; Future  -     Urine Culture, Routine; Future  -     HIV AG AB Combo; Future  -     HCV Antibody; Future  -     Hemoglobin A1C; Future  -     Glucose Tolerance, 50 gm (1 hr), Gestational (ADA); Future  -     Apzbom-VsAhv-JpEsnQt-FA-Fish (OB COMPLETE ONE) 50-1-476 MG Oral Cap; Take 1 tablet by mouth daily.  -     Pyridoxine HCl 25 MG Oral Tab; Take 1 tablet (25 mg total)  by mouth 3 (three) times daily as needed.  -     Ferrous Sulfate 325 (65 Fe) MG Oral Tab; Take 1 tablet (325 mg total) by mouth every other day.  -     Cholecalciferol (VITAMIN D) 50 MCG (2000 UT) Oral Cap; Take 1 capsule (2,000 Units total) by mouth daily.  -     Calcium 500 MG Oral Tab; Take 500 mg by mouth daily.  -     US PREG 1ST TRIM W/EV (CPT=76801/63683); Future      History and physical exam have been performed.  If you ever need to reach a provider please call the office phone number.  After office hours there is always somebody on call.  Reasons to call the provider discussed with patient.  Prenatal vitamins have been prescribed. The prenatal vitamin has iron and folic acid which helps to prevent iron deficiency anemia and neural tube defects.  Additional iron has been prescribed and should be taken every other day.  Vitamin D supplementation 2131-7252 Iunits daily can be given for pregnant patients whose children are deemed at high risk of asthma. (Patient or her  has asthma).  Vitamin B6 can be prescribed if there is nausea or vomiting and is safe to use up to 3 times daily.  Antacids for reflux are safe.  Tylenol Cold daytime or Tylenol flu daytime are safe to use if there are upper respiratory symptoms.  Extra strength Tylenol can be used for persistent headaches and back pain but in a limited fashion.  Avoidance of nonsteroidals discussed with the patient.  A healthy diet is important and dietary counseling done with the patient..  I counseled that fruits, vegetables, legumes, fish, lean meats, chicken, turkey, nuts and seeds are advised.  Fish to avoid are Trell Mackerel, [place], Orange roughy, Shark, Swordfish, Tilefish, Bigeye tuna. Canned light tuna (including skipjack) is a good choice.  Please avoid fried and greasy foods.  Please avoid caffeine, alcohol, THC.  I recommend calcium supplementation 500 mg daily and Vitamin D 1,000 mg daily that the patient can obtain over the counter.   Exercise is encouraged and is okay for 30 minutes daily 5 to 7 days/week.  Moderate intensity exercise (able to carry on a normal conversation during exercise) is advised.  Strength training is allowed in a safe manner as to not cause back injury.  Sexual intercourse is allowed if there is no vaginal bleeding . Hot tubs and saunas should be avoided during the first trimester.  Swimming is okay to participate.  The patient should be up-to-date regarding COVID-19 vaccination and the influenza vaccine is recommended during influenza season.   Pregnancy risk factors were reviewed with the patient and prenatal visit frequency and potential timing of future ultrasounds and fetal monitoring if needed were discussed with the patient.  I reviewed the above with the patient and spent approx 32 minutes face to face consultation.       FOLLOW-UP     Return in about 4 weeks (around 7/17/2024) for prenatal visit.      Kevin Noonan MD  6/19/2024

## 2024-06-19 NOTE — TELEPHONE ENCOUNTER
Current Outpatient Medications   Medication Sig Dispense Refill           Mjdomh-KsGnd-ZuAejYn-FA-Fish (OB COMPLETE ONE) 50-1-476 MG Oral Cap Take 1 tablet by mouth daily. 90 capsule 2      60 tablet 3      90 tablet 3      90 capsule 2      90 tablet 2      15 g 0      84 tablet 4

## 2024-06-24 ENCOUNTER — TELEPHONE (OUTPATIENT)
Dept: OBGYN CLINIC | Facility: CLINIC | Age: 29
End: 2024-06-24

## 2024-06-24 RX ORDER — MULTIVIT-MIN/IRON/FOLIC ACID/K 18-600-40
1 CAPSULE ORAL DAILY
Qty: 90 CAPSULE | Refills: 3 | Status: SHIPPED | OUTPATIENT
Start: 2024-06-24

## 2024-06-24 NOTE — TELEPHONE ENCOUNTER
Patient states she was advised by pharmacy, to have provider send a generic all in one tablet. Please advise

## 2024-06-24 NOTE — TELEPHONE ENCOUNTER
Patient verified name and      Patient requesting alternative prenatal to be sent. New prescription sent.

## 2024-07-02 ENCOUNTER — TELEPHONE (OUTPATIENT)
Dept: OBGYN CLINIC | Facility: CLINIC | Age: 29
End: 2024-07-02

## 2024-07-02 NOTE — TELEPHONE ENCOUNTER
Patient verified name and     Patient states she is experiencing white discharge and itching. Reports symptoms have been ongoing for 3 days. Discussed Monistat 7. Patient to call us if symptoms do not resolve, we would then recommend an visit in office. Patient voiced understanding and agreed.

## 2024-07-08 ENCOUNTER — LAB ENCOUNTER (OUTPATIENT)
Dept: LAB | Age: 29
End: 2024-07-08
Attending: OBSTETRICS & GYNECOLOGY
Payer: MEDICAID

## 2024-07-08 ENCOUNTER — ROUTINE PRENATAL (OUTPATIENT)
Dept: OBGYN CLINIC | Facility: CLINIC | Age: 29
End: 2024-07-08
Payer: COMMERCIAL

## 2024-07-08 VITALS
WEIGHT: 207 LBS | BODY MASS INDEX: 37 KG/M2 | SYSTOLIC BLOOD PRESSURE: 104 MMHG | DIASTOLIC BLOOD PRESSURE: 69 MMHG | HEART RATE: 81 BPM

## 2024-07-08 DIAGNOSIS — O99.211 OBESITY AFFECTING PREGNANCY IN FIRST TRIMESTER, UNSPECIFIED OBESITY TYPE (HCC): Primary | ICD-10-CM

## 2024-07-08 DIAGNOSIS — O99.211 OBESITY AFFECTING PREGNANCY IN FIRST TRIMESTER, UNSPECIFIED OBESITY TYPE (HCC): ICD-10-CM

## 2024-07-08 PROBLEM — E66.9 OBESITY (BMI 35.0-39.9 WITHOUT COMORBIDITY): Status: ACTIVE | Noted: 2024-07-08

## 2024-07-08 LAB
ANTIBODY SCREEN: NEGATIVE
BASOPHILS # BLD AUTO: 0.03 X10(3) UL (ref 0–0.2)
BASOPHILS NFR BLD AUTO: 0.4 %
DEPRECATED RDW RBC AUTO: 43.3 FL (ref 35.1–46.3)
EOSINOPHIL # BLD AUTO: 0.02 X10(3) UL (ref 0–0.7)
EOSINOPHIL NFR BLD AUTO: 0.2 %
ERYTHROCYTE [DISTWIDTH] IN BLOOD BY AUTOMATED COUNT: 15.4 % (ref 11–15)
EST. AVERAGE GLUCOSE BLD GHB EST-MCNC: 100 MG/DL (ref 68–126)
HBA1C MFR BLD: 5.1 % (ref ?–5.7)
HBV SURFACE AG SER-ACNC: <0.1 [IU]/L
HBV SURFACE AG SERPL QL IA: NONREACTIVE
HCT VFR BLD AUTO: 34.9 %
HCV AB SERPL QL IA: NONREACTIVE
HGB BLD-MCNC: 11.3 G/DL
IMM GRANULOCYTES # BLD AUTO: 0.03 X10(3) UL (ref 0–1)
IMM GRANULOCYTES NFR BLD: 0.4 %
LYMPHOCYTES # BLD AUTO: 2.01 X10(3) UL (ref 1–4)
LYMPHOCYTES NFR BLD AUTO: 23.8 %
MCH RBC QN AUTO: 25.3 PG (ref 26–34)
MCHC RBC AUTO-ENTMCNC: 32.4 G/DL (ref 31–37)
MCV RBC AUTO: 78.3 FL
MONOCYTES # BLD AUTO: 0.57 X10(3) UL (ref 0.1–1)
MONOCYTES NFR BLD AUTO: 6.8 %
NEUTROPHILS # BLD AUTO: 5.77 X10 (3) UL (ref 1.5–7.7)
NEUTROPHILS # BLD AUTO: 5.77 X10(3) UL (ref 1.5–7.7)
NEUTROPHILS NFR BLD AUTO: 68.4 %
PLATELET # BLD AUTO: 258 10(3)UL (ref 150–450)
RBC # BLD AUTO: 4.46 X10(6)UL
RH BLOOD TYPE: POSITIVE
RUBV IGG SER QL: POSITIVE
RUBV IGG SER-ACNC: 30.5 IU/ML (ref 10–?)
T PALLIDUM AB SER QL IA: NONREACTIVE
WBC # BLD AUTO: 8.4 X10(3) UL (ref 4–11)

## 2024-07-08 PROCEDURE — 87086 URINE CULTURE/COLONY COUNT: CPT

## 2024-07-08 PROCEDURE — 86803 HEPATITIS C AB TEST: CPT

## 2024-07-08 PROCEDURE — 87340 HEPATITIS B SURFACE AG IA: CPT

## 2024-07-08 PROCEDURE — 36415 COLL VENOUS BLD VENIPUNCTURE: CPT

## 2024-07-08 PROCEDURE — 3074F SYST BP LT 130 MM HG: CPT | Performed by: OBSTETRICS & GYNECOLOGY

## 2024-07-08 PROCEDURE — 87389 HIV-1 AG W/HIV-1&-2 AB AG IA: CPT

## 2024-07-08 PROCEDURE — 83036 HEMOGLOBIN GLYCOSYLATED A1C: CPT

## 2024-07-08 PROCEDURE — 86900 BLOOD TYPING SEROLOGIC ABO: CPT

## 2024-07-08 PROCEDURE — 86762 RUBELLA ANTIBODY: CPT

## 2024-07-08 PROCEDURE — 3078F DIAST BP <80 MM HG: CPT | Performed by: OBSTETRICS & GYNECOLOGY

## 2024-07-08 PROCEDURE — 86780 TREPONEMA PALLIDUM: CPT

## 2024-07-08 PROCEDURE — 86850 RBC ANTIBODY SCREEN: CPT

## 2024-07-08 PROCEDURE — 85025 COMPLETE CBC W/AUTO DIFF WBC: CPT

## 2024-07-08 PROCEDURE — 86901 BLOOD TYPING SEROLOGIC RH(D): CPT

## 2024-07-08 NOTE — PROGRESS NOTES
Yoly Petty is a 29 year old female  Patient's last menstrual period was 2024 (approximate).   Chief Complaint   Patient presents with    Prenatal Care     Pt presents for repeat OB visit        OBSTETRICS HISTORY:     OB History    Para Term  AB Living   4 2 2   1 2   SAB IAB Ectopic Multiple Live Births   1       2      # Outcome Date GA Lbr Roberto Carlos/2nd Weight Sex Type Anes PTL Lv   4 Current            3 Term 16 39w0d  6 lb 3 oz (2.807 kg) M Vag-Spont   EDMUNDO      Birth Comments: vaginal lac, labial lac   2 Term    6 lb 8 oz (2.948 kg) M Vag-Spont   EDMUNDO   1 SAB                GYNE HISTORY:     Hx Prior Abnormal Pap: Yes (7 years ago)  Pap Date: 23  Pap Result Notes: Normal   Menarche: 13 y/o (2024  4:31 PM)  Period Cycle (Days): irregular (2024  4:31 PM)  Period Duration (Days): 7 days (2024  4:31 PM)  Period Flow: Normal (2024  4:31 PM)  Use of Birth Control (if yes, specify type): None (2024  4:31 PM)  Hx Prior Abnormal Pap: Yes (7 years ago) (2024  4:31 PM)  Pap Date: 23 (2024  4:31 PM)  Pap Result Notes: Normal (2024  4:31 PM)        Latest Ref Rng & Units 3/28/2023     8:52 PM   RECENT PAP RESULTS   Thinprep Pap Negative for intraepithelial lesion or malignancy Negative for intraepithelial lesion or malignancy    HPV Negative Negative          MEDICAL HISTORY:     Past Medical History:    Amenorrhea    Anemia    Anemic in 2016; i feel like my levels are the same    Diabetes mellitus (HCC)       SURGICAL HISTORY:     Past Surgical History:   Procedure Laterality Date    Removal gallbladder         SOCIAL HISTORY:     Social History     Socioeconomic History    Marital status:    Tobacco Use    Smoking status: Never     Passive exposure: Never    Smokeless tobacco: Never   Vaping Use    Vaping status: Never Used   Substance and Sexual Activity    Alcohol use: Not Currently     Comment: 2 times per month; a wine glass or  two    Drug use: Never     Social Determinants of Health      [facility]    Housing Stability        FAMILY HISTORY:     Family History   Problem Relation Age of Onset    Cancer Father         Colon cancer; early detenction; partial colon removal    Diabetes Father     Hypertension Mother     Cancer Paternal Grandfather         Colon cancer; passed away from it       MEDICATIONS:       Current Outpatient Medications:     Prenatal MV-Min-Fe Fum-FA-DHA (PRENATAL/FOLIC ACID+DHA) 27-0.8-200 MG Oral Cap, Take 1 tablet by mouth daily., Disp: 90 capsule, Rfl: 3    Pyridoxine HCl 25 MG Oral Tab, Take 1 tablet (25 mg total) by mouth 3 (three) times daily as needed., Disp: 60 tablet, Rfl: 3    Ferrous Sulfate 325 (65 Fe) MG Oral Tab, Take 1 tablet (325 mg total) by mouth every other day., Disp: 90 tablet, Rfl: 3    Cholecalciferol (VITAMIN D) 50 MCG (2000 UT) Oral Cap, Take 1 capsule (2,000 Units total) by mouth daily., Disp: 90 capsule, Rfl: 2    Calcium 500 MG Oral Tab, Take 500 mg by mouth daily., Disp: 90 tablet, Rfl: 2    metFORMIN 500 MG Oral Tab, , Disp: , Rfl:     clotrimazole-betamethasone (LOTRISONE) 1-0.05 % External Cream, Apply 1 Application topically 2 (two) times daily., Disp: 15 g, Rfl: 0    Norgestimate-Eth Estradiol 0.25-35 MG-MCG Oral Tab, Take 1 tablet by mouth daily. (Patient not taking: Reported on 5/7/2024), Disp: 84 tablet, Rfl: 4    ALLERGIES:     No Known Allergies      REVIEW OF SYSTEMS:     Constitutional:    denies fever / chills  Cardiovascular:  denies chest pain or palpitations  Respiratory:    denies shortness of breath  Gastrointestinal:  denies severe abdominal pain, frequent diarrhea or constipation, nausea / vomiting  Genitourinary:    denies dysuria, bothersome incontinence  Skin/Breast:   denies any breast pain, lumps, or discharge  Neurological:    denies frequent severe headaches  Psychiatric:   denies depression or  anxiety, thoughts of harming self or others      PHYSICAL EXAM:   Blood pressure 104/69, pulse 81, weight 207 lb (93.9 kg), last menstrual period 04/03/2024.  Constitutional:  well developed, well nourished, no distress  Abdomen:   soft, gravid, nontender  Musculoskeletal: no cva tenderness bilaterally  Skin/Hair:  no unusual rashes or bruises  Extremities:  no edema, no cyanosis, non tender bilaterally  Psychiatric:   oriented to time, place, person and situation. Appropriate mood and affect      ASSESSMENT & PLAN:     Yoly was seen today for prenatal care.    Diagnoses and all orders for this visit:    Obesity affecting pregnancy in first trimester, unspecified obesity type (HCC)  -     Cystic Fibrosis (CF), 97 Variants; Future  -     Spinal Muscular Atrophy (SMA); Future  -     [id number] PLUS+SCA; Future    Bs ultrasound live iup  Labs w/ free dna, gtt advised  Ultrasound in 5 days      FOLLOW-UP     Return in about 4 weeks (around 8/5/2024) for prenatal visit.      Kevin Noonan MD  7/8/2024

## 2024-07-09 ENCOUNTER — LAB ENCOUNTER (OUTPATIENT)
Dept: LAB | Age: 29
End: 2024-07-09
Attending: OBSTETRICS & GYNECOLOGY
Payer: MEDICAID

## 2024-07-09 DIAGNOSIS — O99.211 OBESITY AFFECTING PREGNANCY IN FIRST TRIMESTER, UNSPECIFIED OBESITY TYPE (HCC): ICD-10-CM

## 2024-07-09 PROCEDURE — 36415 COLL VENOUS BLD VENIPUNCTURE: CPT

## 2024-07-09 PROCEDURE — 81220 CFTR GENE COM VARIANTS: CPT

## 2024-07-09 PROCEDURE — 81329 SMN1 GENE DOS/DELETION ALYS: CPT

## 2024-07-13 ENCOUNTER — HOSPITAL ENCOUNTER (OUTPATIENT)
Dept: ULTRASOUND IMAGING | Age: 29
Discharge: HOME | End: 2024-07-13
Attending: OBSTETRICS & GYNECOLOGY
Payer: MEDICAID

## 2024-07-13 DIAGNOSIS — O99.211 OBESITY AFFECTING PREGNANCY IN FIRST TRIMESTER, UNSPECIFIED OBESITY TYPE (HCC): ICD-10-CM

## 2024-07-13 PROCEDURE — 76801 OB US < 14 WKS SINGLE FETUS: CPT | Performed by: OBSTETRICS & GYNECOLOGY

## 2024-07-16 ENCOUNTER — TELEPHONE (OUTPATIENT)
Dept: OBGYN CLINIC | Facility: CLINIC | Age: 29
End: 2024-07-16

## 2024-07-17 LAB
GESTATIONAL AGE > OR = 9W:: YES
MONOSOMY X (TURNER SYNDROME): NOT DETECTED
TRISOMY 13 (PATAU SYNDROME): NEGATIVE
TRISOMY 18 (EDWARDS SYNDROME): NEGATIVE
TRISOMY 21 (DOWN SYNDROME): NEGATIVE
XXX (TRIPLE X SYNDROME): NOT DETECTED
XXY (KLINEFELTER SYNDROME): DETECTED
XYY (JACOBS SYNDROME): NOT DETECTED

## 2024-07-17 NOTE — TELEPHONE ENCOUNTER
Patient verified name and     Patient inquire about genetic testing results, aware results are still in process. Voiced understanding

## 2024-07-18 ENCOUNTER — TELEPHONE (OUTPATIENT)
Dept: OBGYN CLINIC | Facility: CLINIC | Age: 29
End: 2024-07-18

## 2024-07-18 DIAGNOSIS — Q98.0: Primary | ICD-10-CM

## 2024-07-18 NOTE — TELEPHONE ENCOUNTER
I called the patient and discussed with her the free fetal DNA finding of X XY.  I called maternal-fetal medicine office and requested an appointment when deemed appropriate.

## 2024-07-19 ENCOUNTER — TELEPHONE (OUTPATIENT)
Dept: OBGYN CLINIC | Facility: CLINIC | Age: 29
End: 2024-07-19

## 2024-07-19 NOTE — TELEPHONE ENCOUNTER
Previous telephone encounter of 7/18. Patient has spoken with Dr. Noonan regarding test results. Patient would like to also speak with Dr. Quintero as she was her original provider. Please call.

## 2024-07-23 DIAGNOSIS — Q98.0: Primary | ICD-10-CM

## 2024-07-23 NOTE — TELEPHONE ENCOUNTER
Spoke with patient - she wants Amniocentesis to confirm diagnosis.  Order In - please make MFM aware that she wants the amnio done at [facility] not a visit for a consult.   She wants to go to [place] as closer to home.     Also please let us know if our old [facility] patients call with issues getting In for a visit - we have flex in our schedule to add in patients.     This patient is upset not having access to me as one of my old patients.    Mallory Quintero MD

## 2024-07-24 NOTE — TELEPHONE ENCOUNTER
Spoke with JAIMIE PSR. Patient information provided with request to complete amniocentesis at visit.

## 2024-08-24 NOTE — TELEPHONE ENCOUNTER
Patient name and  verified. Patient informed of positive BV results. Patient prefers Metrogel. Order sent to pharmacy per office protocol. [FreeTextEntry1] : Pt was examined and evaluated. Discussed etiology and pt advised regarding possible etiology of symptoms. Debrided nails x 7   using sterile nail nippers and aseptic technique without incident. Discussed with pt to wear supportive shoe gear with wide toed shoes and extra depth in toe box to accommodate the deformity.   Discussed with patient to perform daily foot checks and monitor for any new lesions, open wounds or calluses and to return to clinic at the earliest. Advised patient to maintain strict glycemic control and to f/u with endo/ PCP on a regular basis  Spent 30 minutes in patient care and medical decision making. RTC in 9 - 12 weeks for follow up. Spent 20 minutes for patient care and medical decision making.

## 2024-09-12 ENCOUNTER — OFFICE VISIT (OUTPATIENT)
Dept: OBGYN CLINIC | Facility: CLINIC | Age: 29
End: 2024-09-12

## 2024-09-12 VITALS
BODY MASS INDEX: 36.52 KG/M2 | HEART RATE: 82 BPM | SYSTOLIC BLOOD PRESSURE: 109 MMHG | WEIGHT: 206.13 LBS | DIASTOLIC BLOOD PRESSURE: 74 MMHG | HEIGHT: 63 IN

## 2024-09-12 DIAGNOSIS — N76.0 VAGINITIS AND VULVOVAGINITIS: ICD-10-CM

## 2024-09-12 DIAGNOSIS — Z12.4 ENCOUNTER FOR PAPANICOLAOU SMEAR FOR CERVICAL CANCER SCREENING: Primary | ICD-10-CM

## 2024-09-12 DIAGNOSIS — N92.6 IRREGULAR MENSES: ICD-10-CM

## 2024-09-12 DIAGNOSIS — Z30.09 FAMILY PLANNING ADVICE: ICD-10-CM

## 2024-09-12 PROCEDURE — 3008F BODY MASS INDEX DOCD: CPT | Performed by: OBSTETRICS & GYNECOLOGY

## 2024-09-12 PROCEDURE — 3074F SYST BP LT 130 MM HG: CPT | Performed by: OBSTETRICS & GYNECOLOGY

## 2024-09-12 PROCEDURE — 3078F DIAST BP <80 MM HG: CPT | Performed by: OBSTETRICS & GYNECOLOGY

## 2024-09-12 PROCEDURE — 99395 PREV VISIT EST AGE 18-39: CPT | Performed by: OBSTETRICS & GYNECOLOGY

## 2024-09-12 NOTE — PROGRESS NOTES
[facility]  Obstetrics and Gynecology  Gynecology Visit    Chief Complaint   Patient presents with    Annual           Yoly Petty is a 29 year old female who presents for annual   LMP: 24.    Menses regular:  28 dyas.    Menstrual flow normal: moderate  .    Birth control or HRT: none   .   Refill none   Last Pap Smear: 23 .  Any history of abnormal paps: none    Last MM24   Any Medication Refills needed today?: none   Sleep: 5 hours .    Diet:  normal .    Exercise: walking every day .   Screening labs/Blood work today: none .     Colonoscopy (if over 44 y/o): unknown.   Gardasil:(age 9-44 y/o)   up to date    Genetic Cancer screen (if indicated): none .   Flu (Aug-April):   not up to date .TDAP (every 10 years)  up to date .      Additional Problems/concerns:  trying to get pregnant, .      Next Appt: 25      Immunization History   Administered Date(s) Administered    DTP 1993, 1993, 1994, 1994    FLUZONE 6 months and older PFS 0.5 ml (94163) 2015, 10/26/2016    Fluvirin, 3 Years & >, Im 2013, 11/10/2015    HEP B 1993, 1994    HPV (Gardasil) 10/01/2014, 10/20/2014, 2014, 2014, 2015, 2015    Hib, Unspecified Formulation 1993, 1993, 1994, 1994    MMR 1994    OPV 1993, 1993, 1994    TDAP 2016         Current Outpatient Medications:     Prenatal MV-Min-Fe Fum-FA-DHA (PRENATAL/FOLIC ACID+DHA) 27-0.8-200 MG Oral Cap, Take 1 tablet by mouth daily., Disp: 90 capsule, Rfl: 3    metFORMIN 500 MG Oral Tab, , Disp: , Rfl:     Pyridoxine HCl 25 MG Oral Tab, Take 1 tablet (25 mg total) by mouth 3 (three) times daily as needed. (Patient not taking: Reported on 2024), Disp: 60 tablet, Rfl: 3    Ferrous Sulfate 325 (65 Fe) MG Oral Tab, Take 1 tablet (325 mg total) by mouth every other day. (Patient not taking: Reported on 2024), Disp: 90 tablet, Rfl: 3     Cholecalciferol (VITAMIN D) 50 MCG (2000 UT) Oral Cap, Take 1 capsule (2,000 Units total) by mouth daily. (Patient not taking: Reported on 2024), Disp: 90 capsule, Rfl: 2    clotrimazole-betamethasone (LOTRISONE) 1-0.05 % External Cream, Apply 1 Application topically 2 (two) times daily., Disp: 15 g, Rfl: 0    Norgestimate-Eth Estradiol 0.25-35 MG-MCG Oral Tab, Take 1 tablet by mouth daily. (Patient not taking: Reported on 2024), Disp: 84 tablet, Rfl: 4    No Known Allergies    OB History    Para Term  AB Living   4 2 2 0 2 2   SAB IAB Ectopic Multiple Live Births   1 1 0 0 2      # Outcome Date GA Lbr Roberto Carlos/2nd Weight Sex Type Anes PTL Lv   4 IAB 24 16w1d    THERAPEUTIC   ND      Birth Comments: Katie   3 Term 16 39w0d  6 lb 3 oz (2.807 kg) M Vag-Spont   EDMUNDO      Birth Comments: vaginal lac, labial lac      Name: Steve   2 Term    6 lb 8 oz (2.948 kg) M Vag-Spont   EDMUNDO      Name: Dell   1 SAB                Past Medical History:    Amenorrhea    Anemia    Anemic in 2016; i feel like my levels are the same    Decorative tattoo    Diabetes mellitus (HCC)       Past Surgical History:   Procedure Laterality Date    Abdominal surgery      Gallbladder removed    Removal gallbladder  2014       Family History   Problem Relation Age of Onset    Cancer Father         Colon cancer; early detenction; partial colon removal    Diabetes Father     Hypertension Mother     Cancer Paternal Grandfather         Colon cancer; passed away from it        Tobacco  Allergies  Meds  Soc Hx        Social History     Socioeconomic History    Marital status:      Spouse name: Not on file    Number of children: Not on file    Years of education: Not on file    Highest education level: Not on file   Occupational History    Not on file   Tobacco Use    Smoking status: Never     Passive exposure: Never    Smokeless tobacco: Never   Vaping Use    Vaping status: Never Used   Substance and  Sexual Activity    Alcohol use: Not Currently     Comment: 2 times per month; a wine glass or two    Drug use: Not Currently    Sexual activity: Not on file   Other Topics Concern    Caffeine Concern Not Asked    Exercise Not Asked    Seat Belt Not Asked    Special Diet Not Asked    Stress Concern Not Asked    Weight Concern Not Asked   Social History Narrative    Not on file     Social Determinants of Health     Financial Resource Strain: Not on file   Food Insecurity: Not on file   Transportation Needs: Not on file   Stress: Not on file   Housing Stability: Low Risk  (7/7/2021)    [facility]    Housing Stability     Mortgage Payment Concerns?: Not on file     Number of Places Lived in the Last Year: Not on file     Unstable Housing?: Not on file     /74 (BP Location: Right arm, Patient Position: Sitting, Cuff Size: adult)   Pulse 82   Ht 5' 3\" (1.6 m)   Wt 206 lb 2.1 oz (93.5 kg)   LMP 08/28/2024 (Approximate)   Breastfeeding No   BMI 36.51 kg/m²     Wt Readings from Last 3 Encounters:   09/12/24 206 lb 2.1 oz (93.5 kg)   07/08/24 207 lb (93.9 kg)   06/19/24 212 lb (96.2 kg)         Health Maintenance   Topic Date Due    Influenza Vaccine (1) 08/01/2021    Screen for Cervical Cancer 11/05/2021    DTaP,Tdap and Td Vaccines (3 - Td or Tdap) 03/18/2025    Hepatitis C Screening Completed    HIV Screening Completed    COVID-19 Vaccine Completed     Review of Systems   General: Present- Feeling well. Not Present- Chills, Fever, Weight Gain and Weight Loss.  HEENT: Not Present- Headache and Sore Throat.  Respiratory: Not Present- Cough, Difficulty Breathing, Hemoptysis and Sputum Production.  Cardiovascular: Not Present- Chest Pain, Elevated Blood Pressure, Fainting / Blacking Out and Shortness of Breath.  Gastrointestinal: Not Present- Constipation, Diarrhea, Nausea and Vomiting.  Female Genitourinary: Not Present- Discharge, Dysuria and  Frequency.  Musculoskeletal: Not Present- Leg Cramps and Swelling of Extremities.  Neurological: Not Present- Dizziness and Headaches.  Psychiatric: Not Present- Anxiety and Depression.  Endocrine: Not Present- Appetite Changes, Hair Changes and Thyroid Problems.  Hematology: Not Present- Easy Bruising and Excessive bleeding.  All other systems negative     Physical Exam The physical exam findings are as follows:     General   Mental Status - Alert. General Appearance - Cooperative. Orientation - Oriented X4. Build & Nutrition - Well nourished.    Head and Neck  Thyroid   Gland Characteristics - normal size and consistency.    Chest and Lung Exam   Inspection:   Chest Wall: - Normal.  Percussion:   Quality and Intensity: - Percussion normal.  Palpation: - Palpation normal.  Auscultation:   Breath sounds: - Normal.  Adventitious sounds: - No Adventitious sounds.    Breast   Nipples: Characteristics - Bilateral - Normal. Discharge - Bilateral - None.  Breast - Bilateral - Symmetric.    Cardiovascular   Auscultation: Rhythm - Regular. Heart Sounds - Normal heart sounds.  Murmurs & Other Heart Sounds: Auscultation of the heart reveals - No Murmurs.    Abdomen   Inspection: Inspection of the abdomen reveals - No Hernias. Incisional scars - no incisional scars.  Palpation/Percussion: Palpation and Percussion of the abdomen reveal - Non Tender and No Palpable abdominal masses.  Liver: - Normal.  Auscultation: Auscultation of the abdomen reveals - Bowel sounds normal.    Female Genitourinary     External Genitalia   Perineum - Normal. Bartholin's Gland - Bilateral - Normal. Clitoris - Normal.  Introitus: Characteristics - No Cystocele, Enterocele or Rectocele. Discharge - None.  Labia Majora: Lesions - Bilateral - None. Characteristics - Bilateral - Normal.  Labia Minora: Lesions - Bilateral - None. Characteristics - Bilateral - Normal.  Urethra: Characteristics - Normal. Discharge - None.  Bern Gland - Bilateral -  Normal.  Vulva: Characteristics - Normal. Lesions - None.    Speculum & Bimanual   Vagina:   Vaginal Wall: - Normal.  Vaginal Lesions - None. Vaginal Mucosa - Normal.  Cervix: Characteristics - No Motion tenderness. Discharge - None.  Uterus: Characteristics - Normal. Position - Midposition.  Adnexa: Characteristics - Bilateral - Normal. Masses - No Adnexal Masses.  Wet Mount: pH - 3.8-4.2. Vaginal discharge - Clear  and Thin. Amine Odor - Absent. Main patient complaints - Discharge.     Rectal   Anorectal Exam: External - normal external exam.    Peripheral Vascular   Upper Extremity:   Palpation: - Pulses bilaterally normal.  Lower Extremity: Inspection - Bilateral - Inspection Normal.  Palpation: Edema - Bilateral - No edema.    Neurologic   Mental Status: - Normal.    Lymphatic  General Lymphatics   Description - Normal .       1. Encounter for Papanicolaou smear for cervical cancer screening  - ThinPrep PAP with HPV Reflex Request B; Future    2. Vaginitis and vulvovaginitis  - Vaginitis Vaginosis PCR Panel; Future    3. Irregular menses  - Progesterone; Future    4. Family planning advice

## 2024-09-13 LAB
BV BACTERIA DNA VAG QL NAA+PROBE: POSITIVE
C GLABRATA DNA VAG QL NAA+PROBE: NEGATIVE
C KRUSEI DNA VAG QL NAA+PROBE: NEGATIVE
CANDIDA DNA VAG QL NAA+PROBE: NEGATIVE
T VAGINALIS DNA VAG QL NAA+PROBE: NEGATIVE

## 2024-09-17 ENCOUNTER — TELEPHONE (OUTPATIENT)
Dept: OBGYN CLINIC | Facility: CLINIC | Age: 29
End: 2024-09-17

## 2024-09-17 LAB
.: NORMAL
.: NORMAL

## 2024-09-17 RX ORDER — METRONIDAZOLE 500 MG/1
500 TABLET ORAL 2 TIMES DAILY
Qty: 14 TABLET | Refills: 0 | Status: SHIPPED | OUTPATIENT
Start: 2024-09-17

## 2024-09-17 NOTE — TELEPHONE ENCOUNTER
----- Message from Mallory Quintero sent at 9/15/2024  7:08 PM CDT -----  Patient has BV - treat with metrogel pv x 7 days preferred and a women's probiotic. Make changes in soap used in shower to an unscented, undyed version. If it is recurrent BV after the 7 nights of metrogel then continue once weekly for 15 weeks to allow the probiotics to work. If declines the metogel, can order flagyl 500mg po bid x 7 days (avoid alcohol with oral treatment).     Mallory Quintero MD

## 2024-09-17 NOTE — TELEPHONE ENCOUNTER
Pt Name and  verified.  Patient informed and verbalized understanding.  Pt preferred oral tx and order sent to confirmed pharmacy on file. No other questions.

## 2024-09-18 LAB — HPV E6+E7 MRNA CVX QL NAA+PROBE: NEGATIVE

## 2024-09-19 ENCOUNTER — TELEPHONE (OUTPATIENT)
Dept: OBGYN CLINIC | Facility: CLINIC | Age: 29
End: 2024-09-19

## 2024-09-19 NOTE — TELEPHONE ENCOUNTER
Patient recently tested positive for bacterial vaginosis. She is concerned with pain she is having in her lower abdomen and cervix. Please advise.

## 2024-09-19 NOTE — TELEPHONE ENCOUNTER
Patient verified name and     Patient states she is experiencing pelvic discomfort since . Recently had annual exam and did not notice pain afterwards. Patient unsure if related to recent course of Metrogel, but noticed the pain prior. Patient reports pain in cervix. Tylenol does not relieve symptoms. Patient scheduled with Dr. Quintero . Patient will call us if symptoms worsen. Voiced understanding and agreed.

## 2024-10-08 ENCOUNTER — LAB ENCOUNTER (OUTPATIENT)
Dept: LAB | Facility: HOSPITAL | Age: 29
End: 2024-10-08
Attending: OBSTETRICS & GYNECOLOGY
Payer: COMMERCIAL

## 2024-10-08 DIAGNOSIS — N92.6 IRREGULAR MENSES: ICD-10-CM

## 2024-10-08 LAB — PROGEST SERPL-MCNC: <0.21 NG/ML

## 2024-10-08 PROCEDURE — 36415 COLL VENOUS BLD VENIPUNCTURE: CPT

## 2024-10-08 PROCEDURE — 84144 ASSAY OF PROGESTERONE: CPT

## 2024-10-09 NOTE — PROGRESS NOTES
No ovulation   Is she still on metformin?   Can try femara day 3 next cycle and repeat day 21 progesterone.     Mallory Quintero MD

## 2024-10-21 ENCOUNTER — PATIENT MESSAGE (OUTPATIENT)
Dept: OBGYN CLINIC | Facility: CLINIC | Age: 29
End: 2024-10-21

## 2024-10-21 ENCOUNTER — TELEPHONE (OUTPATIENT)
Dept: OBGYN CLINIC | Facility: CLINIC | Age: 29
End: 2024-10-21

## 2024-10-21 DIAGNOSIS — Z30.09 FAMILY PLANNING ADVICE: Primary | ICD-10-CM

## 2024-10-22 ENCOUNTER — LAB ENCOUNTER (OUTPATIENT)
Dept: LAB | Age: 29
End: 2024-10-22
Attending: OBSTETRICS & GYNECOLOGY
Payer: COMMERCIAL

## 2024-10-22 DIAGNOSIS — Z30.09 FAMILY PLANNING ADVICE: ICD-10-CM

## 2024-10-22 LAB — PROGEST SERPL-MCNC: 0.41 NG/ML

## 2024-10-22 PROCEDURE — 36415 COLL VENOUS BLD VENIPUNCTURE: CPT

## 2024-10-22 PROCEDURE — 84144 ASSAY OF PROGESTERONE: CPT

## 2024-10-23 NOTE — PROGRESS NOTES
No ovulation - would benefit from femara 5.0mg  and repeat progesterone day 21     Mallory Quintero MD

## 2024-11-06 ENCOUNTER — PATIENT MESSAGE (OUTPATIENT)
Dept: OBGYN CLINIC | Facility: CLINIC | Age: 29
End: 2024-11-06

## 2024-12-20 ASSESSMENT — ENCOUNTER SYMPTOMS
NAUSEA: 0
SHORTNESS OF BREATH: 0
EYE PAIN: 0
NUMBNESS: 0
FATIGUE: 0
DIARRHEA: 0
CONSTIPATION: 0
FEVER: 0
TROUBLE SWALLOWING: 0
BACK PAIN: 0
WHEEZING: 0
AGITATION: 0
HEADACHES: 0
ABDOMINAL PAIN: 0
DIZZINESS: 0
SLEEP DISTURBANCE: 0
WOUND: 0
WEAKNESS: 0
CHEST TIGHTNESS: 0
CHILLS: 0
NERVOUS/ANXIOUS: 0
LIGHT-HEADEDNESS: 0
SORE THROAT: 0
VOMITING: 0

## 2024-12-31 ENCOUNTER — APPOINTMENT (OUTPATIENT)
Dept: GENERAL RADIOLOGY | Age: 29
End: 2024-12-31
Attending: FAMILY MEDICINE

## 2024-12-31 ENCOUNTER — APPOINTMENT (OUTPATIENT)
Dept: SPORTS MEDICINE | Age: 29
End: 2024-12-31

## 2024-12-31 DIAGNOSIS — M79.641 RIGHT HAND PAIN: Primary | ICD-10-CM

## 2025-01-03 ASSESSMENT — ENCOUNTER SYMPTOMS
HEADACHES: 0
SHORTNESS OF BREATH: 0
DIZZINESS: 0
SLEEP DISTURBANCE: 0
LIGHT-HEADEDNESS: 0
SORE THROAT: 0
AGITATION: 0
BACK PAIN: 0
ABDOMINAL PAIN: 0
WOUND: 0
NERVOUS/ANXIOUS: 0
CHILLS: 0
EYE PAIN: 0
NAUSEA: 0
FATIGUE: 0
DIARRHEA: 0
NUMBNESS: 0
FEVER: 0
CONSTIPATION: 0
WHEEZING: 0
WEAKNESS: 0
TROUBLE SWALLOWING: 0
VOMITING: 0
CHEST TIGHTNESS: 0

## 2025-01-09 ENCOUNTER — APPOINTMENT (OUTPATIENT)
Dept: SPORTS MEDICINE | Age: 30
End: 2025-01-09

## 2025-01-09 ENCOUNTER — APPOINTMENT (OUTPATIENT)
Dept: GENERAL RADIOLOGY | Age: 30
End: 2025-01-09
Attending: FAMILY MEDICINE

## 2025-01-16 RX ORDER — LETROZOLE 2.5 MG/1
7.5 TABLET, FILM COATED ORAL DAILY
Qty: 15 TABLET | Refills: 0 | Status: SHIPPED | OUTPATIENT
Start: 2025-01-16

## 2025-02-04 ENCOUNTER — HOSPITAL ENCOUNTER (EMERGENCY)
Age: 30
Discharge: HOME | End: 2025-02-04
Attending: EMERGENCY MEDICINE
Payer: COMMERCIAL

## 2025-02-04 VITALS
OXYGEN SATURATION: 99 % | HEART RATE: 102 BPM | TEMPERATURE: 99 F | BODY MASS INDEX: 33.66 KG/M2 | RESPIRATION RATE: 19 BRPM | HEIGHT: 63 IN | SYSTOLIC BLOOD PRESSURE: 123 MMHG | DIASTOLIC BLOOD PRESSURE: 79 MMHG | WEIGHT: 190 LBS

## 2025-02-04 DIAGNOSIS — U07.1 COVID-19 VIRUS INFECTION: Primary | ICD-10-CM

## 2025-02-04 LAB
POCT INFLUENZA A: NEGATIVE
POCT INFLUENZA B: NEGATIVE
SARS-COV-2 RNA RESP QL NAA+PROBE: DETECTED

## 2025-02-04 PROCEDURE — 99284 EMERGENCY DEPT VISIT MOD MDM: CPT

## 2025-02-04 PROCEDURE — 87502 INFLUENZA DNA AMP PROBE: CPT

## 2025-02-04 PROCEDURE — 99283 EMERGENCY DEPT VISIT LOW MDM: CPT

## 2025-02-04 PROCEDURE — S0119 ONDANSETRON 4 MG: HCPCS

## 2025-02-04 PROCEDURE — 87502 INFLUENZA DNA AMP PROBE: CPT | Performed by: EMERGENCY MEDICINE

## 2025-02-04 RX ORDER — NAPROXEN 500 MG/1
500 TABLET ORAL 2 TIMES DAILY PRN
Qty: 20 TABLET | Refills: 0 | Status: SHIPPED | OUTPATIENT
Start: 2025-02-04 | End: 2025-02-14

## 2025-02-04 RX ORDER — ONDANSETRON 4 MG/1
4 TABLET, ORALLY DISINTEGRATING ORAL ONCE
Status: COMPLETED | OUTPATIENT
Start: 2025-02-04 | End: 2025-02-04

## 2025-02-04 RX ORDER — NAPROXEN 250 MG/1
500 TABLET ORAL ONCE
Status: COMPLETED | OUTPATIENT
Start: 2025-02-04 | End: 2025-02-04

## 2025-02-04 RX ORDER — BENZONATATE 200 MG/1
200 CAPSULE ORAL 3 TIMES DAILY PRN
Qty: 30 CAPSULE | Refills: 0 | Status: SHIPPED | OUTPATIENT
Start: 2025-02-04 | End: 2025-03-06

## 2025-02-05 NOTE — ED INITIAL ASSESSMENT (HPI)
cold like symptoms since 1/23. dx with sinus infection last saturday started on augmentin, having fever and headache again since last night.

## 2025-02-05 NOTE — ED PROVIDER NOTES
Patient Seen in: [facility] In [place]      History     Chief Complaint   Patient presents with    Fever     Sinus      Cough/URI     Stated Complaint: cold like symptoms since 1/23. dx with sinus infection last saturday started on*    Subjective:   HPI      29-year-old female with a past medical history as below presents with fever cough starting yesterday.  Patient states she started having runny nose and congestion about 2 weeks ago.  States she went to immediate care last week and was diagnosed with sinusitis and prescribed antibiotics.  She states fever cough was not present previously but started yesterday.  Denies vomiting or diarrhea.  Feels slightly short of breath at times.  Denies chest pain.    Objective:     Past Medical History:    Amenorrhea    Anemia    Anemic in 2016; i feel like my levels are the same    Decorative tattoo    Diabetes mellitus (HCC)              Past Surgical History:   Procedure Laterality Date    Abdominal surgery  2014    Gallbladder removed    Removal gallbladder  2014                Social History     Socioeconomic History    Marital status:    Tobacco Use    Smoking status: Never     Passive exposure: Never    Smokeless tobacco: Never   Vaping Use    Vaping status: Never Used   Substance and Sexual Activity    Alcohol use: Not Currently     Comment: 2 times per month; a wine glass or two    Drug use: Not Currently     Social Drivers of Health      [place] [facility], [facility]    Housing Stability                  Physical Exam     ED Triage Vitals [02/04/25 2026]   /74   Pulse 120   Resp 18   Temp 99.6 °F (37.6 °C)   Temp src Oral   SpO2 97 %   O2 Device None (Room air)       Current Vitals:   Vital Signs  BP: 129/79  Pulse: 97  Resp: 18  Temp: 98.7 °F (37.1 °C)  Temp src: Oral    Oxygen Therapy  SpO2: 98 %  O2 Device: None (Room air)        Physical Exam  Vitals and nursing note reviewed.    Constitutional:       Appearance: She is well-developed.   HENT:      Head: Normocephalic and atraumatic.      Mouth/Throat:      Mouth: Mucous membranes are moist.      Pharynx: No oropharyngeal exudate or posterior oropharyngeal erythema.   Eyes:      General: No scleral icterus.  Cardiovascular:      Rate and Rhythm: Normal rate and regular rhythm.   Pulmonary:      Effort: Pulmonary effort is normal. No respiratory distress.      Breath sounds: Normal breath sounds. No wheezing.   Skin:     General: Skin is warm and dry.   Neurological:      General: No focal deficit present.      Mental Status: She is alert and oriented to person, place, and time.      Cranial Nerves: No cranial nerve deficit.      Motor: No weakness.   Psychiatric:         Mood and Affect: Mood normal.         Behavior: Behavior normal.             ED Course     Labs Reviewed   RAPID SARS-COV-2 BY PCR - Abnormal; Notable for the following components:       Result Value    Rapid SARS-CoV-2 by PCR Detected (*)     All other components within normal limits   POCT FLU TEST - Normal    Narrative:     This assay is a rapid molecular in vitro test utilizing nucleic acid amplification of influenza A and B viral RNA.                     MDM      29-year-old female with a past medical history as below presents with fever cough starting yesterday.     Differential includes but is not limited to COVID, influenza, other viral respiratory infection.  Chest x-ray was considered however is not indicated as patient has clear lungs with SpO2 99% on room air in exam room.    COVID PCR is positive.    Patient has history of diabetes and meets criteria for Paxlovid therapy.  Will also give Rx for benzonatate and naproxen.    Advise close follow-up with PCP PE.  Return precautions discussed.          Medical Decision Making  Amount and/or Complexity of Data Reviewed  Labs: ordered. Decision-making details documented in ED Course.    Risk  Prescription drug  management.        Disposition and Plan     Clinical Impression:  1. COVID-19 virus infection         Disposition:  Discharge  2/4/2025 10:06 pm    Follow-up:  Volodymyr Quarles  [address]  [facility] [address]  [phone]    Schedule an appointment as soon as possible for a visit            Medications Prescribed:  Current Discharge Medication List        START taking these medications    Details   nirmatrelvir-ritonavir 300-100 MG Oral Tablet Therapy Pack Take two nirmatrelvir tablets (300mg) with one ritonavir tablet (100mg) together twice daily for 5 days.  Qty: 30 tablet, Refills: 0      naproxen 500 MG Oral Tab Take 1 tablet (500 mg total) by mouth 2 (two) times daily as needed.  Qty: 20 tablet, Refills: 0      benzonatate 200 MG Oral Cap Take 1 capsule (200 mg total) by mouth 3 (three) times daily as needed for cough.  Qty: 30 capsule, Refills: 0                 Supplementary Documentation:

## 2025-02-19 ENCOUNTER — APPOINTMENT (OUTPATIENT)
Dept: CARDIOLOGY | Age: 30
End: 2025-02-19

## 2025-02-21 ENCOUNTER — LAB ENCOUNTER (OUTPATIENT)
Dept: LAB | Age: 30
End: 2025-02-21
Attending: OBSTETRICS & GYNECOLOGY
Payer: COMMERCIAL

## 2025-02-21 DIAGNOSIS — N92.6 MISSED MENSES: ICD-10-CM

## 2025-02-21 DIAGNOSIS — Z30.09 FAMILY PLANNING ADVICE: ICD-10-CM

## 2025-02-21 DIAGNOSIS — N92.6 IRREGULAR MENSES: ICD-10-CM

## 2025-02-21 LAB
B-HCG SERPL-ACNC: <2.6 MIU/ML
PROGEST SERPL-MCNC: <0.21 NG/ML

## 2025-02-21 PROCEDURE — 84702 CHORIONIC GONADOTROPIN TEST: CPT

## 2025-02-21 PROCEDURE — 84144 ASSAY OF PROGESTERONE: CPT

## 2025-02-21 PROCEDURE — 36415 COLL VENOUS BLD VENIPUNCTURE: CPT

## 2025-02-26 ENCOUNTER — APPOINTMENT (OUTPATIENT)
Dept: OBGYN | Age: 30
End: 2025-02-26

## 2025-03-06 ENCOUNTER — TELEPHONE (OUTPATIENT)
Dept: OBGYN CLINIC | Facility: CLINIC | Age: 30
End: 2025-03-06

## 2025-03-06 DIAGNOSIS — R39.9 UTI SYMPTOMS: Primary | ICD-10-CM

## 2025-03-06 RX ORDER — SULFAMETHOXAZOLE AND TRIMETHOPRIM 800; 160 MG/1; MG/1
1 TABLET ORAL 2 TIMES DAILY
Qty: 6 TABLET | Refills: 0 | Status: SHIPPED | OUTPATIENT
Start: 2025-03-06 | End: 2025-03-09

## 2025-03-06 NOTE — TELEPHONE ENCOUNTER
Pt name and  verified       Patient reports the following : dysuria, frequency or urgency  Onset of symptoms:    Patient denies complications as noted by protocol to include but not limited to: nausea, vomiting, temp greater than 100, symptoms greater than 7 days, pregnancy, known urologic abnormality, catheterization in last 2 weeks, recent hospitalization (last 2 weeks), failed abx treatment in the last 3 months, or greater than 2 UTIs in the last 6 months.  Treated per protocol with: Bactrim  Patient to present to lab before starting Rx. Order entered for urine culture. Advised office visit needed if no improvement after treatment.  Allergies and pharmacy confirmed.     Pt also requesting for a pap. Last pap was 2024 with normal results. Pt sattes she has a history of cervical cancer. Recent family member just passed from cervical cancer. Pt scheduled for an appointment with Dr. Quintero to discuss pap smear and prevention for cervical cancer.

## 2025-03-18 ENCOUNTER — APPOINTMENT (OUTPATIENT)
Dept: OBGYN | Age: 30
End: 2025-03-18

## 2025-04-04 ENCOUNTER — HOSPITAL ENCOUNTER (EMERGENCY)
Age: 30
Discharge: HOME | End: 2025-04-04
Attending: EMERGENCY MEDICINE
Payer: COMMERCIAL

## 2025-04-04 VITALS
OXYGEN SATURATION: 97 % | HEIGHT: 63 IN | SYSTOLIC BLOOD PRESSURE: 112 MMHG | BODY MASS INDEX: 37.46 KG/M2 | TEMPERATURE: 100 F | HEART RATE: 92 BPM | WEIGHT: 211.44 LBS | DIASTOLIC BLOOD PRESSURE: 69 MMHG | RESPIRATION RATE: 16 BRPM

## 2025-04-04 DIAGNOSIS — B34.9 VIRAL SYNDROME: Primary | ICD-10-CM

## 2025-04-04 DIAGNOSIS — G43.909 MIGRAINE WITHOUT STATUS MIGRAINOSUS, NOT INTRACTABLE, UNSPECIFIED MIGRAINE TYPE: ICD-10-CM

## 2025-04-04 DIAGNOSIS — R11.0 NAUSEA: ICD-10-CM

## 2025-04-04 LAB
ANION GAP SERPL CALC-SCNC: 8 MMOL/L (ref 0–18)
B-HCG UR QL: NEGATIVE
BUN BLD-MCNC: 9 MG/DL (ref 9–23)
CALCIUM BLD-MCNC: 9.2 MG/DL (ref 8.7–10.6)
CHLORIDE SERPL-SCNC: 105 MMOL/L (ref 98–112)
CO2 SERPL-SCNC: 26 MMOL/L (ref 21–32)
CREAT BLD-MCNC: 0.62 MG/DL
EGFRCR SERPLBLD CKD-EPI 2021: 122 ML/MIN/1.73M2 (ref 60–?)
GLUCOSE BLD-MCNC: 99 MG/DL (ref 70–99)
OSMOLALITY SERPL CALC.SUM OF ELEC: 287 MOSM/KG (ref 275–295)
POCT INFLUENZA A: NEGATIVE
POCT INFLUENZA B: NEGATIVE
POTASSIUM SERPL-SCNC: 3.9 MMOL/L (ref 3.5–5.1)
SARS-COV-2 RNA RESP QL NAA+PROBE: NOT DETECTED
SODIUM SERPL-SCNC: 139 MMOL/L (ref 136–145)

## 2025-04-04 PROCEDURE — 87502 INFLUENZA DNA AMP PROBE: CPT | Performed by: EMERGENCY MEDICINE

## 2025-04-04 PROCEDURE — 96374 THER/PROPH/DIAG INJ IV PUSH: CPT

## 2025-04-04 PROCEDURE — 81025 URINE PREGNANCY TEST: CPT

## 2025-04-04 PROCEDURE — 96361 HYDRATE IV INFUSION ADD-ON: CPT

## 2025-04-04 PROCEDURE — 99284 EMERGENCY DEPT VISIT MOD MDM: CPT

## 2025-04-04 PROCEDURE — 96375 TX/PRO/DX INJ NEW DRUG ADDON: CPT

## 2025-04-04 PROCEDURE — 87430 STREP A AG IA: CPT | Performed by: EMERGENCY MEDICINE

## 2025-04-04 PROCEDURE — 80048 BASIC METABOLIC PNL TOTAL CA: CPT | Performed by: EMERGENCY MEDICINE

## 2025-04-04 RX ORDER — DIPHENHYDRAMINE HYDROCHLORIDE 50 MG/ML
25 INJECTION, SOLUTION INTRAMUSCULAR; INTRAVENOUS ONCE
Status: COMPLETED | OUTPATIENT
Start: 2025-04-04 | End: 2025-04-04

## 2025-04-04 RX ORDER — ONDANSETRON 4 MG/1
4 TABLET, ORALLY DISINTEGRATING ORAL EVERY 4 HOURS PRN
Qty: 10 TABLET | Refills: 0 | Status: SHIPPED | OUTPATIENT
Start: 2025-04-04 | End: 2025-04-11

## 2025-04-04 RX ORDER — METOCLOPRAMIDE HYDROCHLORIDE 5 MG/ML
10 INJECTION INTRAMUSCULAR; INTRAVENOUS ONCE
Status: COMPLETED | OUTPATIENT
Start: 2025-04-04 | End: 2025-04-04

## 2025-04-04 RX ORDER — ONDANSETRON 4 MG/1
4 TABLET, ORALLY DISINTEGRATING ORAL ONCE
Status: DISCONTINUED | OUTPATIENT
Start: 2025-04-04 | End: 2025-04-04

## 2025-04-04 RX ORDER — METOCLOPRAMIDE 10 MG/1
10 TABLET ORAL 3 TIMES DAILY PRN
Qty: 20 TABLET | Refills: 0 | Status: SHIPPED | OUTPATIENT
Start: 2025-04-04 | End: 2025-05-04

## 2025-04-04 NOTE — ED INITIAL ASSESSMENT (HPI)
To ER for eval of n/v/diarrhea/cough/fever/chills and body aches that started last night. She  is sick with the flu. Had vomiting x2 today and multiple episodes of diarrhea. Taking Pedialyte in small amts.

## 2025-04-04 NOTE — ED PROVIDER NOTES
Patient Seen in: [facility] In [place]      History     Chief Complaint   Patient presents with    Headache    Nausea/Vomiting/Diarrhea     Stated Complaint: vomiting/diarrhea  2 days exposed to influenza    Subjective:   HPI      Patient comes to the emergency department with complaints of nasal congestion, nausea, vomiting and diarrhea.  The patient states that the symptoms have been persisting over the past 2 to 3 days.  The symptoms are similar to symptoms experienced by her  who has recently been diagnosed with influenza B.  She is concerned today, because she is unable to tolerate oral intake just prior to coming to the emergency department.  Patient has no shortness of breath, but has had a fever of up to 101-1/2 at home.  She has no abdominal pain.  She has no urinary symptoms.  Patient also describes a headache which is identical in character to her previous migraine headaches.    Objective:     Past Medical History:    Amenorrhea    Anemia    Anemic in 2016; i feel like my levels are the same    Decorative tattoo    Diabetes mellitus (HCC)              Past Surgical History:   Procedure Laterality Date    Abdominal surgery  2014    Gallbladder removed    Removal gallbladder  2014                Social History     Socioeconomic History    Marital status:    Tobacco Use    Smoking status: Never     Passive exposure: Never    Smokeless tobacco: Never   Vaping Use    Vaping status: Never Used   Substance and Sexual Activity    Alcohol use: Not Currently     Comment: 2 times per month; a wine glass or two    Drug use: Never     Social Drivers of Health      [facility], [facility]    Housing Stability                  Physical Exam     ED Triage Vitals [04/04/25 1831]   /81   Pulse 104   Resp 18   Temp 98.6 °F (37 °C)   Temp src Oral   SpO2 98 %   O2 Device None (Room air)       Current Vitals:   Vital Signs  BP:  112/69  Pulse: 92  Resp: 16  Temp: 100.1 °F (37.8 °C)  Temp src: Oral    Oxygen Therapy  SpO2: 97 %  O2 Device: None (Room air)        Physical Exam  Vitals and nursing note reviewed.   Constitutional:       Appearance: She is well-developed.   HENT:      Head: Normocephalic.      Mouth/Throat:      Mouth: Mucous membranes are moist.      Comments: Mucous membranes are moist  Cardiovascular:      Rate and Rhythm: Normal rate and regular rhythm.      Heart sounds: Normal heart sounds. No murmur heard.  Pulmonary:      Effort: Pulmonary effort is normal. No respiratory distress.      Breath sounds: Normal breath sounds.   Abdominal:      General: Bowel sounds are normal.      Palpations: Abdomen is soft.      Tenderness: There is no abdominal tenderness. There is no rebound.   Musculoskeletal:         General: No tenderness. Normal range of motion.      Cervical back: Normal range of motion and neck supple.   Lymphadenopathy:      Cervical: No cervical adenopathy.   Skin:     General: Skin is warm and dry.      Findings: No rash.   Neurological:      Mental Status: She is alert and oriented to person, place, and time.      Sensory: No sensory deficit.      Comments: Patient is awake, conversive and has no focal motor or sensory deficits.            ED Course     Labs Reviewed   BASIC METABOLIC PANEL (8) - Normal   POCT PREGNANCY URINE - Normal   RAPID STREP A SCREEN (LC) - Normal    Narrative:     A confirmatory culture is recommended if clinically indicated.   POCT FLU TEST - Normal    Narrative:     This assay is a rapid molecular in vitro test utilizing nucleic acid amplification of influenza A and B viral RNA.   RAPID SARS-COV-2 BY PCR - Normal   RAINBOW DRAW LAVENDER       ED Course as of 04/04/25 2040  ------------------------------------------------------------  Time: 04/04 1900  Value: RAPID STREP A RESULT: Negative for Beta Streptococcus, Group A  Comment:  (Reviewed)  ------------------------------------------------------------  Time: 04/04 1901  Value: POCT urine pregnancy: Negative  Comment: (Reviewed)  ------------------------------------------------------------  Time: 04/04 1907  Value: Rapid SARS-CoV-2 by PCR: Not Detected  Comment: (Reviewed)  ------------------------------------------------------------  Time: 04/04 1908  Value: POCT INFLUENZA A: Negative  Comment: (Reviewed)  ------------------------------------------------------------  Time: 04/04 1908  Value: POCT INFLUENZA B: Negative  Comment: (Reviewed)  ------------------------------------------------------------  Time: 04/04 1922  Value: Basic Metabolic Panel (8)  Comment: Unremarkable    ------------------------------------------------------------  Time: 04/04 1928  Comment: Patient states that she subjectively felt better after receiving Reglan, Benadryl and IV fluids.              MDM      Patient comes to the emergency department with nausea, vomiting, malaise, myalgias and headache.   recently was diagnosed with influenza B.  Differential diagnosis also includes influenza, COVID, metabolic disorders and electrolyte abnormalities.  However all workup in the emergency department objectively was unremarkable.  Patient had improvement in substantial symptom relief after receiving IV fluids, Reglan and Benadryl.  Patient was discharged home with instructions to follow-up with a primary care physician and to return for any acute change or worsening of symptoms.        Medical Decision Making      Disposition and Plan     Clinical Impression:  1. Viral syndrome    2. Migraine without status migrainosus, not intractable, unspecified migraine type    3. Nausea         Disposition:  Discharge  4/4/2025  7:28 pm    Follow-up:  Volodymyr Quarles  [address] [id number] [phone]    Follow up            Medications Prescribed:  Discharge Medication List as of 4/4/2025  7:37 PM         START taking these medications    Details   ondansetron 4 MG Oral Tablet Dispersible Take 1 tablet (4 mg total) by mouth every 4 (four) hours as needed for Nausea., Normal, Disp-10 tablet, R-0      metoclopramide 10 MG Oral Tab Take 1 tablet (10 mg total) by mouth 3 (three) times daily as needed., Normal, Disp-20 tablet, R-0                 Supplementary Documentation:

## 2025-05-23 SDOH — ECONOMIC STABILITY: HOUSING INSECURITY: WHAT IS YOUR LIVING SITUATION TODAY?: I HAVE A STEADY PLACE TO LIVE

## 2025-05-23 SDOH — ECONOMIC STABILITY: FOOD INSECURITY: WITHIN THE PAST 12 MONTHS, THE FOOD YOU BOUGHT JUST DIDN'T LAST AND YOU DIDN'T HAVE MONEY TO GET MORE.: PATIENT DECLINED

## 2025-05-23 SDOH — ECONOMIC STABILITY: HOUSING INSECURITY: DO YOU HAVE PROBLEMS WITH ANY OF THE FOLLOWING?: NONE OF THE ABOVE

## 2025-05-23 SDOH — ECONOMIC STABILITY: TRANSPORTATION INSECURITY
IN THE PAST 12 MONTHS, HAS LACK OF RELIABLE TRANSPORTATION KEPT YOU FROM MEDICAL APPOINTMENTS, MEETINGS, WORK OR FROM GETTING THINGS NEEDED FOR DAILY LIVING?: PATIENT DECLINED

## 2025-05-23 ASSESSMENT — SOCIAL DETERMINANTS OF HEALTH (SDOH)
IN THE PAST 12 MONTHS, HAS THE ELECTRIC, GAS, OIL, OR WATER COMPANY THREATENED TO SHUT OFF SERVICE IN YOUR HOME?: PATIENT DECLINED

## 2025-05-27 ENCOUNTER — WALK IN (OUTPATIENT)
Dept: URGENT CARE | Age: 30
End: 2025-05-27

## 2025-05-27 VITALS
HEART RATE: 79 BPM | SYSTOLIC BLOOD PRESSURE: 112 MMHG | TEMPERATURE: 98 F | OXYGEN SATURATION: 99 % | DIASTOLIC BLOOD PRESSURE: 70 MMHG | RESPIRATION RATE: 14 BRPM

## 2025-05-27 DIAGNOSIS — J02.9 SORE THROAT: Primary | ICD-10-CM

## 2025-05-27 LAB
INTERNAL PROCEDURAL CONTROLS ACCEPTABLE: YES
S PYO AG THROAT QL IA.RAPID: NEGATIVE
TEST LOT EXPIRATION DATE: NORMAL
TEST LOT NUMBER: NORMAL

## 2025-05-27 PROCEDURE — 87880 STREP A ASSAY W/OPTIC: CPT | Performed by: INTERNAL MEDICINE

## 2025-05-27 PROCEDURE — 99204 OFFICE O/P NEW MOD 45 MIN: CPT | Performed by: INTERNAL MEDICINE

## 2025-05-27 RX ORDER — AZITHROMYCIN 250 MG/1
TABLET, FILM COATED ORAL
Qty: 6 TABLET | Refills: 0 | Status: SHIPPED | OUTPATIENT
Start: 2025-05-27 | End: 2025-06-01

## 2025-05-29 ENCOUNTER — APPOINTMENT (OUTPATIENT)
Dept: OBGYN | Age: 30
End: 2025-05-29

## (undated) NOTE — LETTER
[facility], [facility] - OB/GYN  [address]  [facility] [address]  PH: [phone]  FAX: [phone]        24  Yoly Petty, :  1995  [address] [facility] [address]      Patient is currently pregnant normal pregnancy with a due date of 2025  Patient is cleared to travel .

## (undated) NOTE — LETTER
Date & Time: 3/7/2023, 11:18 AM  Patient: Litzy Schaefer  Encounter Provider(s):    MD Karissa Cruz APRN       To Whom It May Concern:    Litzy Schaefer was seen and treated in our department on 3/6/2023. She should not return to work until 3/10/23.     If you have any questions or concerns, please do not hesitate to call.        _____________________________  Physician/APC Signature

## (undated) NOTE — LETTER
10/20/2023              [address] [place] [id number]         To whom it may concern,    The above named patient is currently under my care. Please excuse her from work on 10/20/2023. Please do not hesitate to contact the office with additional questions.       Sincerely,    Mikle Kehr, MD  [facility], [address] [place] [address]  [phone]